# Patient Record
Sex: FEMALE | Race: WHITE | NOT HISPANIC OR LATINO | URBAN - NONMETROPOLITAN AREA
[De-identification: names, ages, dates, MRNs, and addresses within clinical notes are randomized per-mention and may not be internally consistent; named-entity substitution may affect disease eponyms.]

---
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
## 2018-03-02 RX ORDER — PEN NEEDLE, DIABETIC 32GX 5/32"
NEEDLE, DISPOSABLE MISCELLANEOUS
Qty: 200 EACH | Refills: 11 | Status: SHIPPED | OUTPATIENT
Start: 2018-03-02 | End: 2018-06-26

## 2018-03-02 RX ORDER — INSULIN GLARGINE 100 [IU]/ML
INJECTION, SOLUTION SUBCUTANEOUS
Qty: 15 ML | Refills: 11 | Status: SHIPPED | OUTPATIENT
Start: 2018-03-02 | End: 2018-06-26

## 2018-03-02 RX ORDER — INSULIN ASPART 100 [IU]/ML
INJECTION, SOLUTION INTRAVENOUS; SUBCUTANEOUS
Qty: 15 ML | Refills: 11 | Status: SHIPPED | OUTPATIENT
Start: 2018-03-02 | End: 2018-06-26

## 2018-03-15 ENCOUNTER — TELEPHONE (OUTPATIENT)
Dept: ENDOCRINOLOGY | Facility: CLINIC | Age: 18
End: 2018-03-15

## 2018-03-15 ENCOUNTER — TELEPHONE (OUTPATIENT)
Dept: FAMILY MEDICINE CLINIC | Facility: CLINIC | Age: 18
End: 2018-03-15

## 2018-03-28 ENCOUNTER — OFFICE VISIT (OUTPATIENT)
Dept: ENDOCRINOLOGY | Facility: CLINIC | Age: 18
End: 2018-03-28

## 2018-03-28 VITALS
SYSTOLIC BLOOD PRESSURE: 128 MMHG | HEIGHT: 61 IN | WEIGHT: 185 LBS | DIASTOLIC BLOOD PRESSURE: 70 MMHG | BODY MASS INDEX: 34.93 KG/M2 | HEART RATE: 115 BPM

## 2018-03-28 DIAGNOSIS — R80.9 PROTEINURIA, UNSPECIFIED TYPE: ICD-10-CM

## 2018-03-28 DIAGNOSIS — E10.9 TYPE 1 DIABETES MELLITUS WITHOUT COMPLICATION (HCC): Primary | ICD-10-CM

## 2018-03-28 DIAGNOSIS — R63.5 WEIGHT GAIN: ICD-10-CM

## 2018-03-28 PROCEDURE — 99214 OFFICE O/P EST MOD 30 MIN: CPT | Performed by: NURSE PRACTITIONER

## 2018-03-28 NOTE — PROGRESS NOTES
Subjective    Betty Doe is a 17 y.o. female. she is here today for follow-up.    History of Present Illness      Duration/Timing:  Diabetes mellitus type 1, Age at onset of diabetes: 5 years, Onset of symptoms sudden  timing - constant  quality - better controlled--   severity - moderate '     Severity (Complications/Hospitalizations)  Secondary Macrovascular Complications:  No CAD, No CVA, No PAD  Secondary Microvascular Complications:  No Diabetic Nephropathy, Proteinuria, No Diabetic Retinopathy, No Diabetic Neuropathy:  Patient has had DKA     Context  Diabetes Regimen:  Insulin, Last HgbA1c% 9 in Dec. 2016            Blood Glucose Readings     Running 100 to 130      Diet  - counts carbs     Midnight to 4 am --  50 s      Exercise:  Exercises     Associated Signs/Symptoms         Hyperglycemic symptoms - no polyuria, polydipsia, polyphagia       Hypoglycemic Episodes:  Documented symptomatic hypoglycemia, Nocturnal hypoglycemia                   The following portions of the patient's history were reviewed and updated as appropriate:   Past Medical History:   Diagnosis Date   • Anxiety state    • Asthma    • Dehydration    • Diabetic renal disease    • Dysfunctional grieving    • Knee pain    • Proteinuria    • Tachycardia    • Type 1 diabetes mellitus without complication      No past surgical history on file.  Family History   Problem Relation Age of Onset   • Cancer Other    • Diabetes Other    • Hypertension Other    • Stroke Other    • Heart disease Other    • Other Other      gallstones, colon problems     OB History     No data available        Current Outpatient Prescriptions   Medication Sig Dispense Refill   • ACCU-CHEK ARIANA PLUS test strip 1 strip 4 (Four) Times a Day.  99   • BD PEN NEEDLE DARRIUS U/F 32G X 4 MM misc USE AS DIRECTED 5 TIMES DAILY 200 each 11   • clonazePAM (KlonoPIN) 0.5 MG tablet Take 1 tablet by mouth 2 (Two) Times a Day As Needed for Anxiety. 60 tablet 0   • EASY TOUCH  "INSULIN SYRINGE 31G X 5/16\" 1 ML misc Inject 3 times daily 150 each 11   • insulin aspart (NOVOLOG) 100 UNIT/ML injection Inject 90 units in the pump per day 5 each 5   • Insulin Glargine (BASAGLAR KWIKPEN) 100 UNIT/ML injection pen INJECT 50 UNITS AS DIRECTED IN THE EVENING 15 mL 11   • Insulin Pen Needle (COMFORT EZ PEN NEEDLES) 32G X 5 MM misc Inject 1 time daily 100 each 11   • NOVOLOG FLEXPEN 100 UNIT/ML solution pen-injector sc pen INJECT 30 UNITS AS DIRECTED THREE TIMES A DAY WITH MEALS 15 mL 11     No current facility-administered medications for this visit.      Allergies   Allergen Reactions   • Cephalosporins Anaphylaxis   • Aztreonam Itching and Rash   • Penicillins Rash     Social History     Social History   • Marital status: Single     Social History Main Topics   • Smoking status: Never Smoker   • Smokeless tobacco: Never Used   • Alcohol use No   • Drug use: No   • Sexual activity: Defer     Other Topics Concern   • Not on file       Review of Systems  Review of Systems   Constitutional: Negative for activity change, appetite change, diaphoresis and fatigue.   HENT: Negative for facial swelling, sneezing, sore throat, tinnitus, trouble swallowing and voice change.    Eyes: Negative for photophobia, pain, discharge, redness, itching and visual disturbance.   Respiratory: Negative for apnea, cough, choking, chest tightness and shortness of breath.    Cardiovascular: Negative for chest pain, palpitations and leg swelling.   Gastrointestinal: Negative for abdominal distention, abdominal pain, constipation, diarrhea, nausea and vomiting.   Endocrine: Negative for cold intolerance, heat intolerance, polydipsia, polyphagia and polyuria.   Genitourinary: Negative for difficulty urinating, dysuria, frequency, hematuria and urgency.   Musculoskeletal: Negative for arthralgias, back pain, gait problem, joint swelling, myalgias, neck pain and neck stiffness.   Skin: Negative for color change, pallor, rash and " "wound.   Neurological: Negative for dizziness, tremors, weakness, light-headedness, numbness and headaches.   Hematological: Negative for adenopathy. Does not bruise/bleed easily.   Psychiatric/Behavioral: Negative for behavioral problems, confusion and sleep disturbance.        Objective    /70 (BP Location: Left arm, Patient Position: Sitting, Cuff Size: Adult)   Pulse 115   Ht 154.9 cm (61\")   Wt 83.9 kg (185 lb)   BMI 34.96 kg/m²   Physical Exam   Constitutional: She is oriented to person, place, and time. She appears well-developed and well-nourished. No distress.   HENT:   Head: Normocephalic and atraumatic.   Right Ear: External ear normal.   Left Ear: External ear normal.   Nose: Nose normal.   Eyes: Conjunctivae and EOM are normal. Pupils are equal, round, and reactive to light.   Neck: Normal range of motion. Neck supple. No tracheal deviation present. No thyromegaly present.   Cardiovascular: Normal rate, regular rhythm and normal heart sounds.    No murmur heard.  Pulmonary/Chest: Effort normal and breath sounds normal. No respiratory distress. She has no wheezes.   Abdominal: Soft. Bowel sounds are normal. There is no tenderness. There is no rebound and no guarding.   Musculoskeletal: Normal range of motion. She exhibits no edema, tenderness or deformity.   Right leg in boot    Neurological: She is alert and oriented to person, place, and time. No cranial nerve deficit.   Skin: Skin is warm and dry. No rash noted.   Psychiatric: She has a normal mood and affect. Her behavior is normal. Judgment and thought content normal.       Lab Review  Glucose (mg/dl)   Date Value   03/28/2014 121 (H)   03/28/2014 182 (H)   03/28/2014 219 (C)     Sodium (mmol/L)   Date Value   03/28/2014 138   03/28/2014 136   03/28/2014 134 (L)     Potassium (mmol/L)   Date Value   03/28/2014 3.9   03/28/2014 3.3 (L)   03/28/2014 3.3 (L)     Chloride (mmol/L)   Date Value   03/28/2014 111 (H)   03/28/2014 109   03/28/2014 " "108     CO2 (mmol/L)   Date Value   03/28/2014 17 (L)   03/28/2014 18 (L)   03/28/2014 15 (L)     BUN (mg/dl)   Date Value   03/28/2014 7 (L)   03/28/2014 7 (L)   03/28/2014 7 (L)     Creatinine (mg/dl)   Date Value   03/28/2014 0.3 (L)   03/28/2014 0.3 (L)   03/28/2014 0.3 (L)     Hemoglobin A1C (%TotHgb)   Date Value   03/27/2014 13.4 (H)       Assessment/Plan      1. Type 1 diabetes mellitus without complication    .    Medications prescribed:  Outpatient Encounter Prescriptions as of 3/28/2018   Medication Sig Dispense Refill   • ACCU-CHEK ARIANA PLUS test strip 1 strip 4 (Four) Times a Day.  99   • BD PEN NEEDLE DARRIUS U/F 32G X 4 MM misc USE AS DIRECTED 5 TIMES DAILY 200 each 11   • clonazePAM (KlonoPIN) 0.5 MG tablet Take 1 tablet by mouth 2 (Two) Times a Day As Needed for Anxiety. 60 tablet 0   • EASY TOUCH INSULIN SYRINGE 31G X 5/16\" 1 ML misc Inject 3 times daily 150 each 11   • insulin aspart (NOVOLOG) 100 UNIT/ML injection Inject 90 units in the pump per day 5 each 5   • Insulin Glargine (BASAGLAR KWIKPEN) 100 UNIT/ML injection pen INJECT 50 UNITS AS DIRECTED IN THE EVENING 15 mL 11   • Insulin Pen Needle (COMFORT EZ PEN NEEDLES) 32G X 5 MM misc Inject 1 time daily 100 each 11   • NOVOLOG FLEXPEN 100 UNIT/ML solution pen-injector sc pen INJECT 30 UNITS AS DIRECTED THREE TIMES A DAY WITH MEALS 15 mL 11   • [DISCONTINUED] clonazePAM (KlonoPIN) 0.5 MG tablet Take 0.5 mg by mouth.       No facility-administered encounter medications on file as of 3/28/2018.        Orders placed during this encounter include:  No orders of the defined types were placed in this encounter.      Glycemic Management     Now on injections     -Basaglar taking 50 units -- taking 46 units -- decrease to 42 units            Novolog   ---  1 unit for every 4 CHO --- average about 40 units total a day     Do 1 unit per 8 of CHO for snacks      +      Sliding scale            Off insulin pump --- tandem insulin pump -- off insulin pump due " to infections         BASAL     MN - 2.0--- decreased to 1.80  10am - 2.0  6:30pm- 2.00        Carb ratio     MN - 4   12pm -4     Sensitivity     50     target bg 115-120           Microvascular Complication Monitoring     had h o proteinuria and on lisinopril stopped the lisinopril     need to repeat urine protein                 4. Follow-up: No Follow-up on file.

## 2018-06-26 ENCOUNTER — LAB (OUTPATIENT)
Dept: LAB | Facility: OTHER | Age: 18
End: 2018-06-26

## 2018-06-26 ENCOUNTER — OFFICE VISIT (OUTPATIENT)
Dept: FAMILY MEDICINE CLINIC | Facility: CLINIC | Age: 18
End: 2018-06-26

## 2018-06-26 VITALS
WEIGHT: 191.2 LBS | HEIGHT: 61 IN | HEART RATE: 78 BPM | BODY MASS INDEX: 36.1 KG/M2 | DIASTOLIC BLOOD PRESSURE: 66 MMHG | SYSTOLIC BLOOD PRESSURE: 122 MMHG | TEMPERATURE: 98.7 F

## 2018-06-26 DIAGNOSIS — R53.83 FATIGUE, UNSPECIFIED TYPE: Primary | ICD-10-CM

## 2018-06-26 DIAGNOSIS — R59.1 LYMPHADENOPATHY: ICD-10-CM

## 2018-06-26 DIAGNOSIS — E10.9 TYPE 1 DIABETES MELLITUS WITHOUT COMPLICATION (HCC): Chronic | ICD-10-CM

## 2018-06-26 DIAGNOSIS — R53.83 FATIGUE, UNSPECIFIED TYPE: ICD-10-CM

## 2018-06-26 LAB
25(OH)D3 SERPL-MCNC: 29.2 NG/ML (ref 30–100)
ALBUMIN SERPL-MCNC: 4.7 G/DL (ref 3.5–5)
ALBUMIN UR-MCNC: 1.2 MG/L
ALBUMIN/GLOB SERPL: 1.7 G/DL (ref 1.1–1.8)
ALP SERPL-CCNC: 112 U/L (ref 38–126)
ALT SERPL W P-5'-P-CCNC: 17 U/L
ANION GAP SERPL CALCULATED.3IONS-SCNC: 12 MMOL/L (ref 5–15)
AST SERPL-CCNC: 16 U/L (ref 14–36)
BASOPHILS # BLD AUTO: 0.04 10*3/MM3 (ref 0–0.2)
BASOPHILS NFR BLD AUTO: 0.5 % (ref 0–2)
BILIRUB SERPL-MCNC: 0.5 MG/DL (ref 0.2–1.3)
BUN BLD-MCNC: 14 MG/DL (ref 7–17)
BUN/CREAT SERPL: 24.1 (ref 7–25)
CALCIUM SPEC-SCNC: 9.2 MG/DL (ref 8.4–10.2)
CHLORIDE SERPL-SCNC: 100 MMOL/L (ref 98–107)
CO2 SERPL-SCNC: 26 MMOL/L (ref 22–30)
CREAT BLD-MCNC: 0.58 MG/DL (ref 0.52–1.04)
CREAT UR-MCNC: 118 MG/DL
CREAT UR-MCNC: 118 MG/DL
DEPRECATED RDW RBC AUTO: 42.1 FL (ref 36.4–46.3)
EOSINOPHIL # BLD AUTO: 0.22 10*3/MM3 (ref 0–0.7)
EOSINOPHIL NFR BLD AUTO: 2.9 % (ref 0–7)
ERYTHROCYTE [DISTWIDTH] IN BLOOD BY AUTOMATED COUNT: 13.8 % (ref 11.5–14.5)
FERRITIN SERPL-MCNC: 15.3 NG/ML (ref 6.2–137)
FOLATE SERPL-MCNC: 11.2 NG/ML (ref 2.76–21)
GFR SERPL CREATININE-BSD FRML MDRD: 134 ML/MIN/1.73 (ref 71–165)
GLOBULIN UR ELPH-MCNC: 2.8 GM/DL (ref 2.3–3.5)
GLUCOSE BLD-MCNC: 330 MG/DL (ref 74–99)
HBA1C MFR BLD: 9.1 % (ref 4–5.6)
HCT VFR BLD AUTO: 43.5 % (ref 35–45)
HGB BLD-MCNC: 14.2 G/DL (ref 12–15.5)
IRON 24H UR-MRATE: 52 MCG/DL (ref 37–170)
IRON SATN MFR SERPL: 15 % (ref 15–50)
LYMPHOCYTES # BLD AUTO: 2.09 10*3/MM3 (ref 0.6–4.2)
LYMPHOCYTES NFR BLD AUTO: 27.9 % (ref 10–50)
MCH RBC QN AUTO: 27.4 PG (ref 26.5–34)
MCHC RBC AUTO-ENTMCNC: 32.6 G/DL (ref 31.4–36)
MCV RBC AUTO: 83.8 FL (ref 80–98)
MICROALBUMIN/CREAT UR: 10.2 MG/G (ref 0–30)
MONOCYTES # BLD AUTO: 0.51 10*3/MM3 (ref 0–0.9)
MONOCYTES NFR BLD AUTO: 6.8 % (ref 0–12)
NEUTROPHILS # BLD AUTO: 4.64 10*3/MM3 (ref 2–8.6)
NEUTROPHILS NFR BLD AUTO: 61.9 % (ref 37–80)
PLATELET # BLD AUTO: 408 10*3/MM3 (ref 150–450)
PMV BLD AUTO: 9.8 FL (ref 8–12)
POTASSIUM BLD-SCNC: 4.3 MMOL/L (ref 3.4–5)
PROT SERPL-MCNC: 7.5 G/DL (ref 6.3–8.2)
PROT UR-MCNC: 8.9 MG/DL
PROT/CREAT UR: 75.4 MG/G CREA (ref 0–200)
RBC # BLD AUTO: 5.19 10*6/MM3 (ref 3.77–5.16)
SODIUM BLD-SCNC: 138 MMOL/L (ref 137–145)
T4 FREE SERPL-MCNC: 0.96 NG/DL (ref 0.78–2.19)
TIBC SERPL-MCNC: 344 MCG/DL (ref 265–497)
TSH SERPL DL<=0.05 MIU/L-ACNC: 1.04 MIU/ML (ref 0.46–4.68)
VIT B12 BLD-MCNC: 623 PG/ML (ref 239–931)
WBC NRBC COR # BLD: 7.5 10*3/MM3 (ref 3.2–9.8)

## 2018-06-26 PROCEDURE — 83036 HEMOGLOBIN GLYCOSYLATED A1C: CPT | Performed by: NURSE PRACTITIONER

## 2018-06-26 PROCEDURE — 82043 UR ALBUMIN QUANTITATIVE: CPT | Performed by: NURSE PRACTITIONER

## 2018-06-26 PROCEDURE — 84481 FREE ASSAY (FT-3): CPT | Performed by: NURSE PRACTITIONER

## 2018-06-26 PROCEDURE — 82746 ASSAY OF FOLIC ACID SERUM: CPT | Performed by: NURSE PRACTITIONER

## 2018-06-26 PROCEDURE — 84156 ASSAY OF PROTEIN URINE: CPT | Performed by: NURSE PRACTITIONER

## 2018-06-26 PROCEDURE — 82306 VITAMIN D 25 HYDROXY: CPT | Performed by: NURSE PRACTITIONER

## 2018-06-26 PROCEDURE — 84439 ASSAY OF FREE THYROXINE: CPT | Performed by: NURSE PRACTITIONER

## 2018-06-26 PROCEDURE — 82728 ASSAY OF FERRITIN: CPT | Performed by: NURSE PRACTITIONER

## 2018-06-26 PROCEDURE — 83540 ASSAY OF IRON: CPT | Performed by: NURSE PRACTITIONER

## 2018-06-26 PROCEDURE — 99214 OFFICE O/P EST MOD 30 MIN: CPT | Performed by: NURSE PRACTITIONER

## 2018-06-26 PROCEDURE — 83550 IRON BINDING TEST: CPT | Performed by: NURSE PRACTITIONER

## 2018-06-26 PROCEDURE — 80050 GENERAL HEALTH PANEL: CPT | Performed by: NURSE PRACTITIONER

## 2018-06-26 PROCEDURE — 82570 ASSAY OF URINE CREATININE: CPT | Performed by: NURSE PRACTITIONER

## 2018-06-26 PROCEDURE — 82607 VITAMIN B-12: CPT | Performed by: NURSE PRACTITIONER

## 2018-06-26 NOTE — PROGRESS NOTES
"Subjective   Betty Doe is a 17 y.o. female. Patient here today with complaints of Swollen Glands  pt here today with mother reporting weight gain of 60 pounds over the last year, looking over her vital signs she has gained approx 30 pounds since 7/17, she has noticed \"2 knots\" on L neck present x last several months, worsening. Reports fatigue, has hx of type 1 diabetes and cont follow up with [facility].states fam hx of lymphoma, denies fam hx of thyroid problems. She relates that she is walking approx 6 miles 3-4 days/week. 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 Reports hx last fall of sepsis from UTI. Was seen at urgent care yesterday, given doxycycline but has not yet started , has not yet had filled. Denies recent tick bites.    Vitals:    06/26/18 0838   BP: 122/66   Pulse: 78   Temp: 98.7 °F (37.1 °C)     Past Medical History:   Diagnosis Date   • Anxiety state    • Asthma    • Dehydration    • Diabetic renal disease    • Dysfunctional grieving    • Knee pain    • Proteinuria    • Tachycardia    • Type 1 diabetes mellitus without complication      Fatigue   This is a new problem. The current episode started more than 1 month ago. The problem occurs constantly. The problem has been waxing and waning. Associated symptoms include fatigue and swollen glands. Pertinent negatives include no abdominal pain, anorexia, arthralgias, change in bowel habit, chest pain, chills, congestion, coughing, diaphoresis, fever, headaches, joint swelling, myalgias, nausea, neck pain, numbness, rash, sore throat, urinary symptoms, vertigo, visual change, vomiting or weakness. Nothing aggravates the symptoms. She has tried rest, sleep and relaxation for the symptoms. The treatment provided no relief.        The following portions of the patient's history were reviewed and updated as appropriate: allergies, current medications, past family history, past medical history, " past social history, past surgical history and problem list.    Review of Systems   Constitutional: Positive for fatigue. Negative for chills, diaphoresis and fever.   HENT: Negative.  Negative for congestion and sore throat.    Eyes: Negative.    Respiratory: Negative.  Negative for cough.    Cardiovascular: Negative.  Negative for chest pain.   Gastrointestinal: Negative.  Negative for abdominal pain, anorexia, change in bowel habit, nausea and vomiting.   Endocrine: Negative.    Genitourinary: Negative.    Musculoskeletal: Negative.  Negative for arthralgias, joint swelling, myalgias and neck pain.   Skin: Negative.  Negative for rash.   Allergic/Immunologic: Negative.    Neurological: Negative.  Negative for vertigo, weakness, numbness and headaches.   Hematological: Negative.    Psychiatric/Behavioral: Negative.        Objective   Physical Exam   Constitutional: She is oriented to person, place, and time. She appears well-developed and well-nourished. No distress.   HENT:   Head: Normocephalic and atraumatic.   Right Ear: External ear normal.   Left Ear: External ear normal.   Nose: Nose normal.   Mouth/Throat: Oropharynx is clear and moist. No oropharyngeal exudate.   Neck: Neck supple. No neck rigidity. No edema and no erythema present.       Cardiovascular: Normal rate, regular rhythm and normal heart sounds.  Exam reveals no gallop and no friction rub.    No murmur heard.  Pulmonary/Chest: Effort normal and breath sounds normal. No respiratory distress. She has no wheezes. She has no rales.   Abdominal: Soft. Bowel sounds are normal. She exhibits no distension and no mass. There is no tenderness. There is no rebound and no guarding. No hernia.   With obesity, exam somewhat limited due to pt habitus   Lymphadenopathy:     She has cervical adenopathy (palp, enlarged post cervical chain lymph node on L).   Neurological: She is alert and oriented to person, place, and time.   Skin: Skin is warm and dry. No rash  noted. She is not diaphoretic. No erythema. No pallor.   Psychiatric: She has a normal mood and affect. Her behavior is normal.   Nursing note and vitals reviewed.      Assessment/Plan   Betty was seen today for swollen glands.    Diagnoses and all orders for this visit:    Fatigue, unspecified type  -     CBC & Differential; Future  -     Comprehensive metabolic panel; Future  -     TSH; Future  -     T4, free; Future  -     T3, free; Future  -     Ferritin; Future  -     Folate; Future  -     Iron and TIBC; Future  -     Vitamin B12; Future  -     US Head Neck Soft Tissue    Lymphadenopathy  -     CBC & Differential; Future  -     Comprehensive metabolic panel; Future  -     TSH; Future  -     T4, free; Future  -     T3, free; Future  -     Ferritin; Future  -     Folate; Future  -     Iron and TIBC; Future  -     Vitamin B12; Future  -     US Head Neck Soft Tissue    labs ordered as above, US soft tissue neck ordered and she is to follow up here after for results, possible referral if nec. Take all of antibiotic, doxycycline. They are aware and are in agreement to this plan. Patient's Body mass index is 36.13 kg/m². BMI is obese. All questions and concerns are addressed with understanding noted.

## 2018-06-27 LAB — T3FREE SERPL-MCNC: 3.1 PG/ML (ref 2.3–5)

## 2018-06-28 DIAGNOSIS — R59.1 LYMPHADENOPATHY: Primary | ICD-10-CM

## 2018-06-29 ENCOUNTER — TELEPHONE (OUTPATIENT)
Dept: FAMILY MEDICINE CLINIC | Facility: CLINIC | Age: 18
End: 2018-06-29

## 2018-07-02 DIAGNOSIS — R59.9 LYMPH NODE ENLARGEMENT: Primary | ICD-10-CM

## 2018-07-03 ENCOUNTER — OFFICE VISIT (OUTPATIENT)
Dept: FAMILY MEDICINE CLINIC | Facility: CLINIC | Age: 18
End: 2018-07-03

## 2018-07-03 VITALS
DIASTOLIC BLOOD PRESSURE: 72 MMHG | SYSTOLIC BLOOD PRESSURE: 128 MMHG | WEIGHT: 194 LBS | HEART RATE: 114 BPM | BODY MASS INDEX: 36.63 KG/M2 | HEIGHT: 61 IN | OXYGEN SATURATION: 98 %

## 2018-07-03 DIAGNOSIS — R59.1 LYMPHADENOPATHY: ICD-10-CM

## 2018-07-03 DIAGNOSIS — J01.00 ACUTE NON-RECURRENT MAXILLARY SINUSITIS: Primary | ICD-10-CM

## 2018-07-03 DIAGNOSIS — R59.9 SWOLLEN LYMPH NODES: Primary | ICD-10-CM

## 2018-07-03 DIAGNOSIS — F41.1 ANXIETY STATE: ICD-10-CM

## 2018-07-03 DIAGNOSIS — R63.5 WEIGHT GAIN: ICD-10-CM

## 2018-07-03 DIAGNOSIS — E10.9 TYPE 1 DIABETES MELLITUS WITHOUT COMPLICATION (HCC): ICD-10-CM

## 2018-07-03 PROCEDURE — 99214 OFFICE O/P EST MOD 30 MIN: CPT | Performed by: NURSE PRACTITIONER

## 2018-07-03 RX ORDER — LEVOFLOXACIN 500 MG/1
500 TABLET, FILM COATED ORAL DAILY
Qty: 7 TABLET | Refills: 0 | Status: SHIPPED | OUTPATIENT
Start: 2018-07-03 | End: 2018-07-20

## 2018-07-03 RX ORDER — FEXOFENADINE HCL AND PSEUDOEPHEDRINE HCI 180; 240 MG/1; MG/1
1 TABLET, EXTENDED RELEASE ORAL DAILY
Qty: 30 TABLET | Refills: 0 | Status: SHIPPED | OUTPATIENT
Start: 2018-07-03

## 2018-07-03 NOTE — PROGRESS NOTES
Subjective   Betty Doe is a 17 y.o. female. Patient here today with complaints of Results (Go over labs and US and CT. Gained 4 lbs in one week. )  pt here today with mother for CT results, still having fatigue, lymphadenopathy, is diabetic but states fsbs this am 109, has been 112-117 recently. Recently was given doxycycline, symptoms continue. Denies nasal congestion, hx anxiety, diabetes, sepsis from UTI, CT neck was performed without contrast due to hx renal disease. Was initially ordered with contrast as rec per radiology but contrast declined per pt    Vitals:    07/03/18 0900   BP: 128/72   Pulse: 114   SpO2: 98%     Past Medical History:   Diagnosis Date   • Anxiety state    • Asthma    • Dehydration    • Diabetic renal disease (CMS/HCC)    • Dysfunctional grieving    • Knee pain    • Proteinuria    • Tachycardia    • Type 1 diabetes mellitus without complication (CMS/HCC)      Headache    This is a recurrent problem. The current episode started more than 1 month ago. The problem occurs intermittently. The problem has been waxing and waning. The pain is located in the occipital and left unilateral region. The pain does not radiate. The pain quality is similar to prior headaches. The quality of the pain is described as aching. The pain is mild. Associated symptoms include swollen glands. Pertinent negatives include no abdominal pain, abnormal behavior, anorexia, back pain, blurred vision, coughing, dizziness, drainage, ear pain, eye pain, eye redness, eye watering, facial sweating, fever, hearing loss, loss of balance, muscle aches, nausea, neck pain, numbness, phonophobia, photophobia, rhinorrhea, scalp tenderness, seizures, sinus pressure, sore throat, tingling, tinnitus, visual change, vomiting, weakness or weight loss. Nothing aggravates the symptoms. She has tried NSAIDs for the symptoms. The treatment provided mild relief. Her past medical history is significant for obesity and sinus disease.    Sinusitis   This is a new problem. The current episode started 1 to 4 weeks ago. The problem is unchanged. There has been no fever. The pain is mild. Associated symptoms include congestion, headaches and swollen glands. Pertinent negatives include no chills, coughing, diaphoresis, ear pain, hoarse voice, neck pain, shortness of breath, sinus pressure, sneezing or sore throat. Past treatments include antibiotics. The treatment provided no relief.   Fatigue   This is a new problem. The current episode started 1 to 4 weeks ago. The problem occurs constantly. The problem has been unchanged. Associated symptoms include congestion, fatigue, headaches and swollen glands. Pertinent negatives include no abdominal pain, anorexia, arthralgias, change in bowel habit, chest pain, chills, coughing, diaphoresis, fever, joint swelling, myalgias, nausea, neck pain, numbness, rash, sore throat, urinary symptoms, vertigo, visual change, vomiting or weakness. The symptoms are aggravated by exertion. She has tried rest, sleep and relaxation for the symptoms. The treatment provided no relief.        The following portions of the patient's history were reviewed and updated as appropriate: allergies, current medications, past family history, past medical history, past social history, past surgical history and problem list.    Review of Systems   Constitutional: Positive for fatigue. Negative for chills, diaphoresis, fever and weight loss.   HENT: Positive for congestion. Negative for ear pain, hearing loss, hoarse voice, rhinorrhea, sinus pressure, sneezing, sore throat and tinnitus.    Eyes: Negative.  Negative for blurred vision, photophobia, pain and redness.   Respiratory: Negative.  Negative for cough and shortness of breath.    Cardiovascular: Negative.  Negative for chest pain.   Gastrointestinal: Negative.  Negative for abdominal pain, anorexia, change in bowel habit, nausea and vomiting.   Endocrine: Negative.    Genitourinary:  Negative.    Musculoskeletal: Negative.  Negative for arthralgias, back pain, joint swelling, myalgias and neck pain.   Skin: Negative.  Negative for rash.   Allergic/Immunologic: Negative.    Neurological: Positive for headaches. Negative for dizziness, vertigo, tingling, seizures, weakness, numbness and loss of balance.   Hematological: Negative.    Psychiatric/Behavioral: Negative.        Objective   Physical Exam   Constitutional: She is oriented to person, place, and time. She appears well-developed and well-nourished. No distress.   HENT:   Head: Normocephalic and atraumatic.   Right Ear: Hearing, external ear and ear canal normal. Tympanic membrane is bulging.   Left Ear: Hearing, external ear and ear canal normal. Tympanic membrane is bulging.   Nose: Mucosal edema present. Right sinus exhibits no maxillary sinus tenderness and no frontal sinus tenderness. Left sinus exhibits no maxillary sinus tenderness and no frontal sinus tenderness.   Mouth/Throat: Uvula is midline and mucous membranes are normal. Posterior oropharyngeal erythema (with cobblestoning appearance) present. No tonsillar exudate.   Neck: Neck supple.   Cardiovascular: Normal rate, regular rhythm and normal heart sounds.  Exam reveals no gallop and no friction rub.    No murmur heard.  Pulmonary/Chest: Effort normal and breath sounds normal. No respiratory distress. She has no wheezes. She has no rales.   Abdominal:   With obesity, exam limited   Lymphadenopathy:     She has cervical adenopathy (tender to palpation).   Neurological: She is alert and oriented to person, place, and time.   Skin: Skin is warm and dry. No rash noted. She is not diaphoretic. No erythema. No pallor.   Psychiatric: Her behavior is normal. Her mood appears anxious.   Nursing note and vitals reviewed.      Assessment/Plan   Betty was seen today for results.    Diagnoses and all orders for this visit:    Acute non-recurrent maxillary  sinusitis    Lymphadenopathy  Comments:  tender, cervical      Weight gain    Anxiety state    Type 1 diabetes mellitus without complication (CMS/[facility])  Comments:  following with dr jose/roni    Other orders  -     fexofenadine-pseudoephedrine (ALLEGRA-D ALLERGY & CONGESTION) 180-240 MG per 24 hr tablet; Take 1 tablet by mouth Daily.  -     levoFLOXacin (LEVAQUIN) 500 MG tablet; Take 1 tablet by mouth Daily.    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   She is aware and is in agreement to this plan  Patient's Body mass index is 36.68 kg/m². BMI is obese.   All questions and concerns are addressed with understanding noted.   She is treated with levaquin and allegra d as above, stop doxycycline which she states has not helped her symptoms.   RTC should symptoms persist/worsen.  She refuses flonase stating that she has panic attacks/severe anxiety with using nasal sprays.   Steroids not given due to diabetes.

## 2018-07-09 DIAGNOSIS — R59.9 SWOLLEN LYMPH NODES: Primary | ICD-10-CM

## 2018-07-20 ENCOUNTER — TELEPHONE (OUTPATIENT)
Dept: ENDOCRINOLOGY | Facility: CLINIC | Age: 18
End: 2018-07-20

## 2018-07-20 ENCOUNTER — OFFICE VISIT (OUTPATIENT)
Dept: ENDOCRINOLOGY | Facility: CLINIC | Age: 18
End: 2018-07-20

## 2018-07-20 VITALS
HEIGHT: 61 IN | DIASTOLIC BLOOD PRESSURE: 82 MMHG | WEIGHT: 194 LBS | BODY MASS INDEX: 36.63 KG/M2 | HEART RATE: 93 BPM | SYSTOLIC BLOOD PRESSURE: 118 MMHG

## 2018-07-20 DIAGNOSIS — R59.0 ENLARGED LYMPH NODE IN NECK: ICD-10-CM

## 2018-07-20 DIAGNOSIS — R59.9 ENLARGED LYMPH NODES: Primary | ICD-10-CM

## 2018-07-20 DIAGNOSIS — E10.9 TYPE 1 DIABETES MELLITUS WITHOUT COMPLICATION (HCC): Primary | ICD-10-CM

## 2018-07-20 PROCEDURE — 99214 OFFICE O/P EST MOD 30 MIN: CPT | Performed by: NURSE PRACTITIONER

## 2018-07-20 NOTE — TELEPHONE ENCOUNTER
ALIS GIANG (Key: [id number])   Rx #: [id number]   NovoLOG 100UNIT/ML solution   Form  [employer] [place] Medicaid Coverage Determination Form   Plan Contact  [phone] phone   [phone] fax   Created   1 hour ago   Sent to Plan   now   Determination   Wait for Determination  Please wait for the plan to return a determination.

## 2018-07-20 NOTE — PROGRESS NOTES
"  Subjective    Betty Doe is a 18 y.o. female. she is here today for follow-up.    History of Present Illness       Duration/Timing:  Diabetes mellitus type 1, Age at onset of diabetes: 5 years, Onset of symptoms sudden  timing - constant  quality - better controlled--   severity - moderate '     Severity (Complications/Hospitalizations)  Secondary Macrovascular Complications:  No CAD, No CVA, No PAD  Secondary Microvascular Complications:  No Diabetic Nephropathy, Proteinuria, No Diabetic Retinopathy, No Diabetic Neuropathy:  Patient has had DKA     Context    Diabetes Regimen: Insulin    Lab Results   Component Value Date    HGBA1C 9.1 (H) 06/26/2018                Blood Glucose Readings     Running better control     Exercise:  Exercises     Associated Signs/Symptoms           Hyperglycemic symptoms - no polyuria, polydipsia, polyphagia         Hypoglycemic Episodes:  Documented symptomatic hypoglycemia, Nocturnal hypoglycemia              The following portions of the patient's history were reviewed and updated as appropriate:   Past Medical History:   Diagnosis Date   • Anxiety state    • Asthma    • Dehydration    • Diabetic renal disease (CMS/HCC)    • Dysfunctional grieving    • Knee pain    • Proteinuria    • Tachycardia    • Type 1 diabetes mellitus without complication (CMS/HCC)      History reviewed. No pertinent surgical history.  Family History   Problem Relation Age of Onset   • Cancer Other    • Diabetes Other    • Hypertension Other    • Stroke Other    • Heart disease Other    • Other Other         gallstones, colon problems     OB History     No data available        Current Outpatient Prescriptions   Medication Sig Dispense Refill   • insulin aspart (novoLOG) 100 UNIT/ML injection 120 units daily through insulin pump 4 each 11   • clonazePAM (KlonoPIN) 0.5 MG tablet Take 1 tablet by mouth 2 (Two) Times a Day As Needed for Anxiety. 60 tablet 0   • EASY TOUCH INSULIN SYRINGE 31G X 5/16\" 1 " ML misc Inject 3 times daily 150 each 11   • fexofenadine-pseudoephedrine (ALLEGRA-D ALLERGY & CONGESTION) 180-240 MG per 24 hr tablet Take 1 tablet by mouth Daily. 30 tablet 0   • Insulin Admin Supplies (PUMPING INSULIN BOOK VOLUME 2) misc        No current facility-administered medications for this visit.      Allergies   Allergen Reactions   • Aztreonam Itching and Rash   • Cephalosporins Anaphylaxis   • Penicillins Rash     Social History     Social History   • Marital status: Single     Social History Main Topics   • Smoking status: Never Smoker   • Smokeless tobacco: Never Used   • Alcohol use No   • Drug use: No   • Sexual activity: Defer     Other Topics Concern   • Not on file       Review of Systems  Review of Systems   Constitutional: Negative for activity change, appetite change, diaphoresis and fatigue.   HENT: Negative for facial swelling, sneezing, sore throat, tinnitus, trouble swallowing and voice change.    Eyes: Negative for photophobia, pain, discharge, redness, itching and visual disturbance.   Respiratory: Negative for apnea, cough, choking, chest tightness and shortness of breath.    Cardiovascular: Negative for chest pain, palpitations and leg swelling.   Gastrointestinal: Negative for abdominal distention, abdominal pain, constipation, diarrhea, nausea and vomiting.   Endocrine: Negative for cold intolerance, heat intolerance, polydipsia, polyphagia and polyuria.   Genitourinary: Negative for difficulty urinating, dysuria, frequency, hematuria and urgency.   Musculoskeletal: Negative for arthralgias, back pain, gait problem, joint swelling, myalgias, neck pain and neck stiffness.   Skin: Negative for color change, pallor, rash and wound.   Neurological: Negative for dizziness, tremors, weakness, light-headedness, numbness and headaches.   Hematological: Negative for adenopathy. Does not bruise/bleed easily.   Psychiatric/Behavioral: Negative for behavioral problems, confusion and sleep  "disturbance.        Objective    /82 (BP Location: Right arm, Patient Position: Sitting, Cuff Size: Adult)   Pulse 93   Ht 154.9 cm (61\")   Wt 88 kg (194 lb)   BMI 36.66 kg/m²   Physical Exam   Constitutional: She is oriented to person, place, and time. She appears well-developed and well-nourished. No distress.   HENT:   Head: Normocephalic and atraumatic.   Right Ear: External ear normal.   Left Ear: External ear normal.   Nose: Nose normal.   Eyes: Pupils are equal, round, and reactive to light. Conjunctivae and EOM are normal.   Neck: Normal range of motion. Neck supple. No tracheal deviation present. No thyromegaly present.   Cardiovascular: Normal rate, regular rhythm and normal heart sounds.    No murmur heard.  Pulmonary/Chest: Effort normal and breath sounds normal. No respiratory distress. She has no wheezes.   Abdominal: Soft. Bowel sounds are normal. There is no tenderness. There is no rebound and no guarding.   Musculoskeletal: Normal range of motion. She exhibits no edema, tenderness or deformity.   Neurological: She is alert and oriented to person, place, and time. No cranial nerve deficit.   Skin: Skin is warm and dry. No rash noted.   Psychiatric: She has a normal mood and affect. Her behavior is normal. Judgment and thought content normal.       Lab Review  Glucose   Date Value   06/26/2018 330 mg/dL (H)   03/28/2014 121 mg/dl (H)   03/28/2014 182 mg/dl (H)   03/28/2014 219 mg/dl (C)     Sodium (mmol/L)   Date Value   06/26/2018 138   03/28/2014 138   03/28/2014 136   03/28/2014 134 (L)     Potassium (mmol/L)   Date Value   06/26/2018 4.3   03/28/2014 3.9   03/28/2014 3.3 (L)   03/28/2014 3.3 (L)     Chloride (mmol/L)   Date Value   06/26/2018 100   03/28/2014 111 (H)   03/28/2014 109   03/28/2014 108     CO2 (mmol/L)   Date Value   06/26/2018 26.0   03/28/2014 17 (L)   03/28/2014 18 (L)   03/28/2014 15 (L)     BUN   Date Value   06/26/2018 14 mg/dL   03/28/2014 7 mg/dl (L)   03/28/2014 7 " "mg/dl (L)   03/28/2014 7 mg/dl (L)     Creatinine   Date Value   06/26/2018 0.58 mg/dL   03/28/2014 0.3 mg/dl (L)   03/28/2014 0.3 mg/dl (L)   03/28/2014 0.3 mg/dl (L)     Hemoglobin A1C   Date Value   06/26/2018 9.1 % (H)   03/27/2014 13.4 %TotHgb (H)       Assessment/Plan      1. Type 1 diabetes mellitus without complication (CMS/[facility])    2. Enlarged lymph node in neck    .    Medications prescribed:  Outpatient Encounter Prescriptions as of 7/20/2018   Medication Sig Dispense Refill   • insulin aspart (novoLOG) 100 UNIT/ML injection 120 units daily through insulin pump 4 each 11   • [DISCONTINUED] insulin aspart (novoLOG) 100 UNIT/ML injection Inject  under the skin 3 (Three) Times a Day Before Meals.     • clonazePAM (KlonoPIN) 0.5 MG tablet Take 1 tablet by mouth 2 (Two) Times a Day As Needed for Anxiety. 60 tablet 0   • EASY TOUCH INSULIN SYRINGE 31G X 5/16\" 1 ML misc Inject 3 times daily 150 each 11   • fexofenadine-pseudoephedrine (ALLEGRA-D ALLERGY & CONGESTION) 180-240 MG per 24 hr tablet Take 1 tablet by mouth Daily. 30 tablet 0   • Insulin Admin Supplies (PUMPING INSULIN BOOK VOLUME 2) misc      • [DISCONTINUED] levoFLOXacin (LEVAQUIN) 500 MG tablet Take 1 tablet by mouth Daily. 7 tablet 0     No facility-administered encounter medications on file as of 7/20/2018.        Orders placed during this encounter include:  No orders of the defined types were placed in this encounter.    Glycemic Management     Lab Results   Component Value Date    HGBA1C 9.1 (H) 06/26/2018           Previous regimen     -Basaglar  42 units            Novolog   ---       Do 1 unit per 8 of CHO for snacks           Now back on tandem insulin pump         BASAL     MN -1.80          Carb ratio     MN - 5     Sensitivity     50     target bg 115-120           Microvascular Complication Monitoring     had h o proteinuria and on lisinopril stopped the lisinopril     need to repeat urine " protein        -----------------------------------------      Lymph node enlargement in the neck     Refer to ENT          4. Follow-up: Return in about 4 months (around 11/20/2018) for Recheck.

## 2018-07-23 ENCOUNTER — TELEPHONE (OUTPATIENT)
Dept: ENDOCRINOLOGY | Facility: CLINIC | Age: 18
End: 2018-07-23

## 2018-07-27 ENCOUNTER — TELEPHONE (OUTPATIENT)
Dept: FAMILY MEDICINE CLINIC | Facility: CLINIC | Age: 18
End: 2018-07-27

## 2018-08-16 ENCOUNTER — TELEPHONE (OUTPATIENT)
Dept: FAMILY MEDICINE CLINIC | Facility: CLINIC | Age: 18
End: 2018-08-16

## 2018-08-16 ENCOUNTER — TELEPHONE (OUTPATIENT)
Dept: ENDOCRINOLOGY | Facility: CLINIC | Age: 18
End: 2018-08-16

## 2018-08-16 NOTE — TELEPHONE ENCOUNTER
Christie Doe-mother called for her- checking to see if office has ever found out anything about her insulin- that was going to send letter to ins about it- said really needs to know about it-said she is away at college in [place] now. Christie can be reached at [phone].

## 2018-08-29 ENCOUNTER — TELEPHONE (OUTPATIENT)
Dept: INTERNAL MEDICINE | Facility: CLINIC | Age: 18
End: 2018-08-29

## 2018-09-26 ENCOUNTER — TELEPHONE (OUTPATIENT)
Dept: ENDOCRINOLOGY | Facility: CLINIC | Age: 18
End: 2018-09-26

## 2018-11-20 ENCOUNTER — OFFICE VISIT (OUTPATIENT)
Dept: ENDOCRINOLOGY | Facility: CLINIC | Age: 18
End: 2018-11-20

## 2018-11-20 VITALS
HEIGHT: 61 IN | SYSTOLIC BLOOD PRESSURE: 128 MMHG | HEART RATE: 113 BPM | BODY MASS INDEX: 37.95 KG/M2 | DIASTOLIC BLOOD PRESSURE: 80 MMHG | WEIGHT: 201 LBS

## 2018-11-20 DIAGNOSIS — R80.9 MICROALBUMINURIA DUE TO TYPE 1 DIABETES MELLITUS (HCC): ICD-10-CM

## 2018-11-20 DIAGNOSIS — E10.29 MICROALBUMINURIA DUE TO TYPE 1 DIABETES MELLITUS (HCC): ICD-10-CM

## 2018-11-20 DIAGNOSIS — E10.65 TYPE 1 DIABETES MELLITUS WITH HYPERGLYCEMIA (HCC): Primary | ICD-10-CM

## 2018-11-20 PROCEDURE — 99214 OFFICE O/P EST MOD 30 MIN: CPT | Performed by: NURSE PRACTITIONER

## 2018-11-20 NOTE — PROGRESS NOTES
Subjective    Betty Doe is a 18 y.o. female. she is here today for follow-up.    History of Present Illness       Duration/Timing:  Diabetes mellitus type 1, Age at onset of diabetes: 5 years, Onset of symptoms sudden  timing - constant  quality - better controlled--   severity - moderate '     Severity (Complications/Hospitalizations)  Secondary Macrovascular Complications:  No CAD, No CVA, No PAD  Secondary Microvascular Complications:  No Diabetic Nephropathy, Proteinuria, No Diabetic Retinopathy, No Diabetic Neuropathy:  Patient has had DKA     Context     Diabetes Regimen: Insulin           Lab Results   Component Value Date     HGBA1C 9.1 (H) 06/26/2018                  Blood Glucose Readings     States has been variable     States under a lot of stress and sugars are now out of control     States waking up high     She has had some low sugars as well         Exercise:  Exercises     Associated Signs/Symptoms           Hyperglycemic symptoms - no polyuria, polydipsia, polyphagia         Hypoglycemic Episodes:  Documented symptomatic hypoglycemia, Nocturnal hypoglycemia                 The following portions of the patient's history were reviewed and updated as appropriate:   Past Medical History:   Diagnosis Date   • Anxiety state    • Asthma    • Dehydration    • Diabetic renal disease (CMS/HCC)    • Dysfunctional grieving    • Knee pain    • Proteinuria    • Tachycardia    • Type 1 diabetes mellitus without complication (CMS/HCC)      History reviewed. No pertinent surgical history.  Family History   Problem Relation Age of Onset   • Cancer Other    • Diabetes Other    • Hypertension Other    • Stroke Other    • Heart disease Other    • Other Other         gallstones, colon problems     OB History     No data available        Current Outpatient Medications   Medication Sig Dispense Refill   • clonazePAM (KlonoPIN) 0.5 MG tablet Take 1 tablet by mouth 2 (Two) Times a Day As Needed for Anxiety. 60  "tablet 0   • EASY TOUCH INSULIN SYRINGE 31G X 5/16\" 1 ML misc Inject 3 times daily 150 each 11   • fexofenadine-pseudoephedrine (ALLEGRA-D ALLERGY & CONGESTION) 180-240 MG per 24 hr tablet Take 1 tablet by mouth Daily. 30 tablet 0   • Insulin Admin Supplies (PUMPING INSULIN BOOK VOLUME 2) misc      • insulin aspart (novoLOG) 100 UNIT/ML injection 120 units daily through insulin pump 4 each 11   • Insulin Lispro (HUMALOG KWIKPEN) 200 UNIT/ML solution pen-injector Inject 120 Units under the skin Daily. Thru insulin pump. 50 mL 5     No current facility-administered medications for this visit.      Allergies   Allergen Reactions   • Aztreonam Itching and Rash   • Cephalosporins Anaphylaxis   • Penicillins Rash     Social History     Socioeconomic History   • Marital status: Single     Spouse name: Not on file   • Number of children: Not on file   • Years of education: Not on file   • Highest education level: Not on file   Tobacco Use   • Smoking status: Never Smoker   • Smokeless tobacco: Never Used   Substance and Sexual Activity   • Alcohol use: No   • Drug use: No   • Sexual activity: Defer       Review of Systems  Review of Systems   Constitutional: Negative for activity change, appetite change, diaphoresis and fatigue.   HENT: Negative for facial swelling, sneezing, sore throat, tinnitus, trouble swallowing and voice change.    Eyes: Negative for photophobia, pain, discharge, redness, itching and visual disturbance.   Respiratory: Negative for apnea, cough, choking, chest tightness and shortness of breath.    Cardiovascular: Negative for chest pain, palpitations and leg swelling.   Gastrointestinal: Negative for abdominal distention, abdominal pain, constipation, diarrhea, nausea and vomiting.   Endocrine: Negative for cold intolerance, heat intolerance, polydipsia, polyphagia and polyuria.   Genitourinary: Negative for difficulty urinating, dysuria, frequency, hematuria and urgency.   Musculoskeletal: Negative " "for arthralgias, back pain, gait problem, joint swelling, myalgias, neck pain and neck stiffness.   Skin: Negative for color change, pallor, rash and wound.   Neurological: Negative for dizziness, tremors, weakness, light-headedness, numbness and headaches.   Hematological: Negative for adenopathy. Does not bruise/bleed easily.   Psychiatric/Behavioral: Negative for behavioral problems, confusion and sleep disturbance.        Objective    /80 (BP Location: Right arm, Patient Position: Sitting, Cuff Size: Adult)   Pulse 113   Ht 154.9 cm (61\")   Wt 91.2 kg (201 lb)   BMI 37.98 kg/m²   Physical Exam   Constitutional: She is oriented to person, place, and time. She appears well-developed and well-nourished. No distress.   HENT:   Head: Normocephalic and atraumatic.   Right Ear: External ear normal.   Left Ear: External ear normal.   Nose: Nose normal.   Eyes: Conjunctivae and EOM are normal. Pupils are equal, round, and reactive to light.   Neck: Normal range of motion. Neck supple. No tracheal deviation present. No thyromegaly present.   Cardiovascular: Normal rate, regular rhythm and normal heart sounds.   No murmur heard.  Pulmonary/Chest: Effort normal and breath sounds normal. No respiratory distress. She has no wheezes.   Abdominal: Soft. Bowel sounds are normal. There is no tenderness. There is no rebound and no guarding.   Musculoskeletal: Normal range of motion. She exhibits no edema, tenderness or deformity.   Neurological: She is alert and oriented to person, place, and time. No cranial nerve deficit.   Skin: Skin is warm and dry. No rash noted.   Psychiatric: She has a normal mood and affect. Her behavior is normal. Judgment and thought content normal.       Lab Review  Glucose   Date Value   06/26/2018 330 mg/dL (H)   03/28/2014 121 mg/dl (H)   03/28/2014 182 mg/dl (H)   03/28/2014 219 mg/dl (C)     Sodium (mmol/L)   Date Value   06/26/2018 138   03/28/2014 138   03/28/2014 136   03/28/2014 134 " "(L)     Potassium (mmol/L)   Date Value   06/26/2018 4.3   03/28/2014 3.9   03/28/2014 3.3 (L)   03/28/2014 3.3 (L)     Chloride (mmol/L)   Date Value   06/26/2018 100   03/28/2014 111 (H)   03/28/2014 109   03/28/2014 108     CO2 (mmol/L)   Date Value   06/26/2018 26.0   03/28/2014 17 (L)   03/28/2014 18 (L)   03/28/2014 15 (L)     BUN   Date Value   06/26/2018 14 mg/dL   03/28/2014 7 mg/dl (L)   03/28/2014 7 mg/dl (L)   03/28/2014 7 mg/dl (L)     Creatinine   Date Value   06/26/2018 0.58 mg/dL   03/28/2014 0.3 mg/dl (L)   03/28/2014 0.3 mg/dl (L)   03/28/2014 0.3 mg/dl (L)     Hemoglobin A1C   Date Value   06/26/2018 9.1 % (H)   03/27/2014 13.4 %TotHgb (H)       Assessment/Plan      1. Type 1 diabetes mellitus with hyperglycemia (CMS/[facility])    2. Microalbuminuria due to type 1 diabetes mellitus (CMS/[facility])    .    Medications prescribed:  Outpatient Encounter Medications as of 11/20/2018   Medication Sig Dispense Refill   • clonazePAM (KlonoPIN) 0.5 MG tablet Take 1 tablet by mouth 2 (Two) Times a Day As Needed for Anxiety. 60 tablet 0   • EASY TOUCH INSULIN SYRINGE 31G X 5/16\" 1 ML misc Inject 3 times daily 150 each 11   • fexofenadine-pseudoephedrine (ALLEGRA-D ALLERGY & CONGESTION) 180-240 MG per 24 hr tablet Take 1 tablet by mouth Daily. 30 tablet 0   • Insulin Admin Supplies (PUMPING INSULIN BOOK VOLUME 2) misc      • insulin aspart (novoLOG) 100 UNIT/ML injection 120 units daily through insulin pump 4 each 11   • Insulin Lispro (HUMALOG KWIKPEN) 200 UNIT/ML solution pen-injector Inject 120 Units under the skin Daily. Thru insulin pump. 50 mL 5     No facility-administered encounter medications on file as of 11/20/2018.        Orders placed during this encounter include:  Orders Placed This Encounter   Procedures   • Comprehensive Metabolic Panel   • Hemoglobin A1c   • Protein / Creatinine Ratio, Urine - Urine, Clean Catch   • Microalbumin / Creatinine Urine Ratio - Urine, Clean Catch   • CBC & Differential     " Order Specific Question:   Manual Differential     Answer:   No     Glycemic Management           Lab Results   Component Value Date     HGBA1C 9.1 (H) 06/26/2018         can only use Novolog     Cannot use Humalog caused side effects          Previous regimen     -Basaglar  42 units            Novolog   ---        Do 1 unit per 8 of CHO for snacks            Now back on tandem insulin pump      BASAL     MN -1.0  7:30 am - 1.0  7:30 pm - 1.35            Carb ratio     MN - 5     Sensitivity     50      target bg 115-120        She has a dexcom but not able to get sensors at this point          Microvascular Complication Monitoring     had h o proteinuria and on lisinopril stopped the lisinopril     need to repeat urine protein           -----------------------------------------         4. Follow-up: Return in about 4 months (around 3/20/2019).

## 2018-12-17 ENCOUNTER — TELEPHONE (OUTPATIENT)
Dept: ENDOCRINOLOGY | Facility: CLINIC | Age: 18
End: 2018-12-17

## 2018-12-17 NOTE — TELEPHONE ENCOUNTER
ALIS GIANG (Key: [id number])   Rx #: [id number]   NovoLOG 100UNIT/ML SC SOLN   Form  [employer] Electronic [employer] Form   Created   2 days ago   Sent to Plan   2 days ago   Plan Response   23 hours ago   Submit Clinical Questions   16 minutes ago   Determination   Wait for Determination  Please wait for [employer] to return a determination.

## 2018-12-18 ENCOUNTER — TELEPHONE (OUTPATIENT)
Dept: ENDOCRINOLOGY | Facility: CLINIC | Age: 18
End: 2018-12-18

## 2019-03-11 ENCOUNTER — OFFICE VISIT (OUTPATIENT)
Dept: ENDOCRINOLOGY | Facility: CLINIC | Age: 19
End: 2019-03-11

## 2019-03-11 VITALS
SYSTOLIC BLOOD PRESSURE: 118 MMHG | WEIGHT: 202 LBS | DIASTOLIC BLOOD PRESSURE: 74 MMHG | HEART RATE: 91 BPM | HEIGHT: 61 IN | BODY MASS INDEX: 38.14 KG/M2

## 2019-03-11 DIAGNOSIS — E10.65 TYPE 1 DIABETES MELLITUS WITH HYPERGLYCEMIA (HCC): Primary | ICD-10-CM

## 2019-03-11 PROCEDURE — 99213 OFFICE O/P EST LOW 20 MIN: CPT | Performed by: NURSE PRACTITIONER

## 2019-03-11 NOTE — PROGRESS NOTES
Subjective    Betty Doe is a 18 y.o. female. she is here today for follow-up.    History of Present Illness         Duration/Timing:  Diabetes mellitus type 1, Age at onset of diabetes: 5 years, Onset of symptoms sudden  timing - constant  quality - better controlled--   severity - moderate '     Severity (Complications/Hospitalizations)  Secondary Macrovascular Complications:  No CAD, No CVA, No PAD  Secondary Microvascular Complications:  No Diabetic Nephropathy, Proteinuria, No Diabetic Retinopathy, No Diabetic Neuropathy:  Patient has had DKA     Context     Diabetes Regimen: Insulin             Lab Results   Component Value Date    HGBA1C 9.1 (H) 06/26/2018             Blood Glucose Readings     States has been variable      States under a lot of stress and sugars are now out of control      States waking up high      She has had some low sugars as well     Dexcom average is 148          Exercise:  Exercises     Associated Signs/Symptoms           Hyperglycemic symptoms - no polyuria, polydipsia, polyphagia         Hypoglycemic Episodes:  Documented symptomatic hypoglycemia, Nocturnal hypoglycemia       The following portions of the patient's history were reviewed and updated as appropriate:   Past Medical History:   Diagnosis Date   • Anxiety state    • Asthma    • Dehydration    • Diabetic renal disease (CMS/HCC)    • Dysfunctional grieving    • Knee pain    • Proteinuria    • Tachycardia    • Type 1 diabetes mellitus without complication (CMS/HCC)      History reviewed. No pertinent surgical history.  Family History   Problem Relation Age of Onset   • Cancer Other    • Diabetes Other    • Hypertension Other    • Stroke Other    • Heart disease Other    • Other Other         gallstones, colon problems     OB History     No data available        Current Outpatient Medications   Medication Sig Dispense Refill   • clonazePAM (KlonoPIN) 0.5 MG tablet Take 1 tablet by mouth 2 (Two) Times a Day As  "Needed for Anxiety. 60 tablet 0   • EASY TOUCH INSULIN SYRINGE 31G X 5/16\" 1 ML misc Inject 3 times daily 150 each 11   • fexofenadine-pseudoephedrine (ALLEGRA-D ALLERGY & CONGESTION) 180-240 MG per 24 hr tablet Take 1 tablet by mouth Daily. 30 tablet 0   • Insulin Admin Supplies (PUMPING INSULIN BOOK VOLUME 2) misc      • insulin aspart (novoLOG) 100 UNIT/ML injection 120 units daily through insulin pump 4 each 11   • Insulin Lispro (HUMALOG KWIKPEN) 200 UNIT/ML solution pen-injector Inject 120 Units under the skin Daily. Thru insulin pump. 50 mL 5     No current facility-administered medications for this visit.      Allergies   Allergen Reactions   • Aztreonam Itching and Rash   • Cephalosporins Anaphylaxis   • Penicillins Rash     Social History     Socioeconomic History   • Marital status: Single     Spouse name: Not on file   • Number of children: Not on file   • Years of education: Not on file   • Highest education level: Not on file   Tobacco Use   • Smoking status: Never Smoker   • Smokeless tobacco: Never Used   Substance and Sexual Activity   • Alcohol use: No   • Drug use: No   • Sexual activity: Defer       Review of Systems  Review of Systems   Constitutional: Negative for activity change, appetite change, diaphoresis and fatigue.   HENT: Negative for facial swelling, sneezing, sore throat, tinnitus, trouble swallowing and voice change.    Eyes: Negative for photophobia, pain, discharge, redness, itching and visual disturbance.   Respiratory: Negative for apnea, cough, choking, chest tightness and shortness of breath.    Cardiovascular: Negative for chest pain, palpitations and leg swelling.   Gastrointestinal: Negative for abdominal distention, abdominal pain, constipation, diarrhea, nausea and vomiting.   Endocrine: Negative for cold intolerance, heat intolerance, polydipsia, polyphagia and polyuria.   Genitourinary: Negative for difficulty urinating, dysuria, frequency, hematuria and urgency. " "  Musculoskeletal: Negative for arthralgias, back pain, gait problem, joint swelling, myalgias, neck pain and neck stiffness.   Skin: Negative for color change, pallor, rash and wound.   Neurological: Negative for dizziness, tremors, weakness, light-headedness, numbness and headaches.   Hematological: Negative for adenopathy. Does not bruise/bleed easily.   Psychiatric/Behavioral: Negative for behavioral problems, confusion and sleep disturbance.        Objective    /74 (BP Location: Right arm, Patient Position: Sitting, Cuff Size: Adult)   Pulse 91   Ht 154.9 cm (61\")   Wt 91.6 kg (202 lb)   BMI 38.17 kg/m²   Physical Exam   Constitutional: She is oriented to person, place, and time. She appears well-developed and well-nourished. No distress.   HENT:   Head: Normocephalic and atraumatic.   Right Ear: External ear normal.   Left Ear: External ear normal.   Nose: Nose normal.   Eyes: Conjunctivae and EOM are normal. Pupils are equal, round, and reactive to light.   Neck: Normal range of motion. Neck supple. No tracheal deviation present. No thyromegaly present.   Cardiovascular: Normal rate, regular rhythm and normal heart sounds.   No murmur heard.  Pulmonary/Chest: Effort normal and breath sounds normal. No respiratory distress. She has no wheezes.   Abdominal: Soft. Bowel sounds are normal. There is no tenderness. There is no rebound and no guarding.   Musculoskeletal: Normal range of motion. She exhibits no edema, tenderness or deformity.   Neurological: She is alert and oriented to person, place, and time. No cranial nerve deficit.   Skin: Skin is warm and dry. No rash noted.   Psychiatric: She has a normal mood and affect. Her behavior is normal. Judgment and thought content normal.       Lab Review  Glucose   Date Value   06/26/2018 330 mg/dL (H)   03/28/2014 121 mg/dl (H)   03/28/2014 182 mg/dl (H)   03/28/2014 219 mg/dl (C)     Sodium (mmol/L)   Date Value   06/26/2018 138   03/28/2014 138 " "  03/28/2014 136   03/28/2014 134 (L)     Potassium (mmol/L)   Date Value   06/26/2018 4.3   03/28/2014 3.9   03/28/2014 3.3 (L)   03/28/2014 3.3 (L)     Chloride (mmol/L)   Date Value   06/26/2018 100   03/28/2014 111 (H)   03/28/2014 109   03/28/2014 108     CO2 (mmol/L)   Date Value   06/26/2018 26.0   03/28/2014 17 (L)   03/28/2014 18 (L)   03/28/2014 15 (L)     BUN   Date Value   06/26/2018 14 mg/dL   03/28/2014 7 mg/dl (L)   03/28/2014 7 mg/dl (L)   03/28/2014 7 mg/dl (L)     Creatinine   Date Value   06/26/2018 0.58 mg/dL   03/28/2014 0.3 mg/dl (L)   03/28/2014 0.3 mg/dl (L)   03/28/2014 0.3 mg/dl (L)     Hemoglobin A1C   Date Value   06/26/2018 9.1 % (H)   03/27/2014 13.4 %TotHgb (H)       Assessment/Plan      1. Type 1 diabetes mellitus with hyperglycemia (CMS/[facility])    .    Medications prescribed:  Outpatient Encounter Medications as of 3/11/2019   Medication Sig Dispense Refill   • clonazePAM (KlonoPIN) 0.5 MG tablet Take 1 tablet by mouth 2 (Two) Times a Day As Needed for Anxiety. 60 tablet 0   • EASY TOUCH INSULIN SYRINGE 31G X 5/16\" 1 ML misc Inject 3 times daily 150 each 11   • fexofenadine-pseudoephedrine (ALLEGRA-D ALLERGY & CONGESTION) 180-240 MG per 24 hr tablet Take 1 tablet by mouth Daily. 30 tablet 0   • Insulin Admin Supplies (PUMPING INSULIN BOOK VOLUME 2) misc      • insulin aspart (novoLOG) 100 UNIT/ML injection 120 units daily through insulin pump 4 each 11   • Insulin Lispro (HUMALOG KWIKPEN) 200 UNIT/ML solution pen-injector Inject 120 Units under the skin Daily. Thru insulin pump. 50 mL 5     No facility-administered encounter medications on file as of 3/11/2019.        Orders placed during this encounter include:  No orders of the defined types were placed in this encounter.    Glycemic Management               Lab Results   Component Value Date     HGBA1C 9.1 (H) 06/26/2018         can only use Novolog      Cannot use Humalog caused side effects            Previous regimen     -Basaglar "  42 units            Novolog   ---        Do 1 unit per 8 of CHO for snacks            Now back on tandem insulin pump        BASAL     MN -1.0  7:30 am - 1.25  7:30 pm - 1.55           Carb ratio     MN - 5     Sensitivity     50          target bg 115-120           She has a dexcom but not able to get sensors at this point                  Microvascular Complication Monitoring     had h o proteinuria and on lisinopril stopped the lisinopril     need to repeat urine protein           -----------------------------------------         4. Follow-up: Return for Recheck.

## 2019-04-01 RX ORDER — LANCETS 33 GAUGE
EACH MISCELLANEOUS
Qty: 150 EACH | Refills: 11 | Status: SHIPPED | OUTPATIENT
Start: 2019-04-01

## 2019-10-30 ENCOUNTER — TELEPHONE (OUTPATIENT)
Dept: ENDOCRINOLOGY | Facility: CLINIC | Age: 19
End: 2019-10-30

## 2019-10-30 NOTE — TELEPHONE ENCOUNTER
ALIS GIANG (Key: [id number])   Rx #: [id number]   NovoLOG 100UNIT/ML solution   Form  [facility] Electronic Prior Authorization Form   Created   2 days ago   Sent to Plan   11 minutes ago   Plan Response   11 minutes ago   Submit Clinical Questions   1 minute ago   Determination   Wait for Determination  Please wait for [facility] to return a determination.

## 2019-10-31 ENCOUNTER — OFFICE VISIT (OUTPATIENT)
Dept: ENDOCRINOLOGY | Facility: CLINIC | Age: 19
End: 2019-10-31

## 2019-10-31 ENCOUNTER — APPOINTMENT (OUTPATIENT)
Dept: LAB | Facility: HOSPITAL | Age: 19
End: 2019-10-31

## 2019-10-31 VITALS
SYSTOLIC BLOOD PRESSURE: 122 MMHG | HEART RATE: 120 BPM | HEIGHT: 61 IN | DIASTOLIC BLOOD PRESSURE: 82 MMHG | WEIGHT: 209.6 LBS | BODY MASS INDEX: 39.57 KG/M2 | OXYGEN SATURATION: 98 %

## 2019-10-31 DIAGNOSIS — E66.01 MORBIDLY OBESE (HCC): ICD-10-CM

## 2019-10-31 DIAGNOSIS — R80.9 PROTEINURIA, UNSPECIFIED TYPE: ICD-10-CM

## 2019-10-31 DIAGNOSIS — E10.65 TYPE 1 DIABETES MELLITUS WITH HYPERGLYCEMIA (HCC): Primary | ICD-10-CM

## 2019-10-31 PROBLEM — E66.9 CLASS 2 OBESITY WITH BODY MASS INDEX (BMI) OF 38.0 TO 38.9 IN ADULT: Status: ACTIVE | Noted: 2019-10-31

## 2019-10-31 LAB
ALBUMIN UR-MCNC: 3.2 MG/DL
CREAT UR-MCNC: 186.2 MG/DL
CREAT UR-MCNC: 186.2 MG/DL
MICROALBUMIN/CREAT UR: 17.2 MG/G
PROT UR-MCNC: 15 MG/DL
PROT/CREAT UR: 80.6 MG/G CREA (ref 0–200)

## 2019-10-31 PROCEDURE — 82570 ASSAY OF URINE CREATININE: CPT | Performed by: NURSE PRACTITIONER

## 2019-10-31 PROCEDURE — 36415 COLL VENOUS BLD VENIPUNCTURE: CPT | Performed by: NURSE PRACTITIONER

## 2019-10-31 PROCEDURE — 84156 ASSAY OF PROTEIN URINE: CPT | Performed by: NURSE PRACTITIONER

## 2019-10-31 PROCEDURE — 82043 UR ALBUMIN QUANTITATIVE: CPT | Performed by: NURSE PRACTITIONER

## 2019-10-31 PROCEDURE — 99214 OFFICE O/P EST MOD 30 MIN: CPT | Performed by: NURSE PRACTITIONER

## 2019-10-31 NOTE — PROGRESS NOTES
Subjective    Betty Doe is a 19 y.o. female. she is here today for follow-up.    History of Present Illness     Reason - diabetes mellitus       Patient has had a lot of trouble with controlling her BG levels while at school; she has been near DKA at least two times and states was able to push fluids and run temporary basal on her pump to correct the ketones     She is doing better -- states bg levels are getting back to normal       Duration/Timing:  Diabetes mellitus type 1, Age at onset of diabetes: 5 years, Onset of symptoms sudden  timing - constant  quality - better controlled--   severity -high due to history of DKA      Severity (Complications/Hospitalizations)  Secondary Macrovascular Complications:  No CAD, No CVA, No PAD  Secondary Microvascular Complications:  No Diabetic Nephropathy, Proteinuria, No Diabetic Retinopathy, No Diabetic Neuropathy:  Patient has had DKA     Context     Diabetes Regimen: Insulin                    Lab Results   Component Value Date     HGBA1C 9.1 (H) 06/26/2018               Blood Glucose Readings          Dexcom -- currently out of sensors    Brings log book     BG variable from 86 up to 335       Tandem insulin pump       Exercise:  Exercises     Associated Signs/Symptoms           Hyperglycemic symptoms - no polyuria, polydipsia, polyphagia         Hypoglycemic Episodes:  Documented symptomatic hypoglycemia, Nocturnal hypoglycemia          The following portions of the patient's history were reviewed and updated as appropriate:   Past Medical History:   Diagnosis Date   • Anxiety state    • Asthma    • Dehydration    • Diabetic renal disease (CMS/HCC)    • Dysfunctional grieving    • Knee pain    • Proteinuria    • Tachycardia    • Type 1 diabetes mellitus without complication (CMS/HCC)      History reviewed. No pertinent surgical history.  Family History   Problem Relation Age of Onset   • Cancer Other    • Diabetes Other    • Hypertension Other    • Stroke  "Other    • Heart disease Other    • Other Other         gallstones, colon problems     OB History     No data available        Current Outpatient Medications   Medication Sig Dispense Refill   • clonazePAM (KlonoPIN) 0.5 MG tablet Take 1 tablet by mouth 2 (Two) Times a Day As Needed for Anxiety. 60 tablet 0   • EASY TOUCH INSULIN SYRINGE 31G X 5/16\" 1 ML misc Inject 3 times daily 150 each 11   • fexofenadine-pseudoephedrine (ALLEGRA-D ALLERGY & CONGESTION) 180-240 MG per 24 hr tablet Take 1 tablet by mouth Daily. 30 tablet 0   • Insulin Admin Supplies (PUMPING INSULIN BOOK VOLUME 2) misc      • insulin aspart (NOVOLOG) 100 UNIT/ML injection  UNITS DAILY THROUGH INSULIN PUMP 40 mL 5   • insulin lispro (humaLOG) 100 UNIT/ML injection Use up to 120 units daily through insulin pump 40 mL 11   • ONETOUCH DELICA LANCETS 33G misc USE TO TEST FOUR TIMES DAILY 150 each 11     No current facility-administered medications for this visit.      Allergies   Allergen Reactions   • Aztreonam Itching and Rash   • Cephalosporins Anaphylaxis   • Penicillins Rash     Social History     Socioeconomic History   • Marital status: Single     Spouse name: Not on file   • Number of children: Not on file   • Years of education: Not on file   • Highest education level: Not on file   Tobacco Use   • Smoking status: Never Smoker   • Smokeless tobacco: Never Used   Substance and Sexual Activity   • Alcohol use: No   • Drug use: No   • Sexual activity: Defer       Review of Systems  Review of Systems   Constitutional: Negative for activity change, appetite change, diaphoresis and fatigue.   HENT: Negative for facial swelling, sneezing, sore throat, tinnitus, trouble swallowing and voice change.    Eyes: Negative for photophobia, pain, discharge, redness, itching and visual disturbance.   Respiratory: Negative for apnea, cough, choking, chest tightness and shortness of breath.    Cardiovascular: Negative for chest pain, palpitations and leg " "swelling.   Gastrointestinal: Negative for abdominal distention, abdominal pain, constipation, diarrhea, nausea and vomiting.   Endocrine: Negative for cold intolerance, heat intolerance, polydipsia, polyphagia and polyuria.   Genitourinary: Negative for difficulty urinating, dysuria, frequency, hematuria and urgency.   Musculoskeletal: Negative for arthralgias, back pain, gait problem, joint swelling, myalgias, neck pain and neck stiffness.   Skin: Negative for color change, pallor, rash and wound.   Neurological: Negative for dizziness, tremors, weakness, light-headedness, numbness and headaches.   Hematological: Negative for adenopathy. Does not bruise/bleed easily.   Psychiatric/Behavioral: Negative for behavioral problems, confusion and sleep disturbance.        Objective    /82   Pulse 120   Ht 154.9 cm (61\")   Wt 95.1 kg (209 lb 9.6 oz)   SpO2 98%   BMI 39.60 kg/m²   Physical Exam   Constitutional: She is oriented to person, place, and time. She appears well-developed and well-nourished. No distress.   HENT:   Head: Normocephalic and atraumatic.   Right Ear: External ear normal.   Left Ear: External ear normal.   Nose: Nose normal.   Eyes: Conjunctivae and EOM are normal. Pupils are equal, round, and reactive to light.   Neck: Normal range of motion. Neck supple. No tracheal deviation present. No thyromegaly present.   Cardiovascular: Normal rate, regular rhythm and normal heart sounds.   No murmur heard.  Pulmonary/Chest: Effort normal and breath sounds normal. No respiratory distress. She has no wheezes.   Abdominal: Soft. Bowel sounds are normal. There is no tenderness. There is no rebound and no guarding.   Musculoskeletal: Normal range of motion. She exhibits no edema, tenderness or deformity.   Neurological: She is alert and oriented to person, place, and time. No cranial nerve deficit.   Skin: Skin is warm and dry. No rash noted.   Psychiatric: She has a normal mood and affect. Her behavior " "is normal. Judgment and thought content normal.       Lab Review  Glucose   Date Value   06/26/2018 330 mg/dL (H)   03/28/2014 121 mg/dl (H)   03/28/2014 182 mg/dl (H)   03/28/2014 219 mg/dl (C)     Sodium (mmol/L)   Date Value   06/26/2018 138   03/28/2014 138   03/28/2014 136   03/28/2014 134 (L)     Potassium (mmol/L)   Date Value   06/26/2018 4.3   03/28/2014 3.9   03/28/2014 3.3 (L)   03/28/2014 3.3 (L)     Chloride (mmol/L)   Date Value   06/26/2018 100   03/28/2014 111 (H)   03/28/2014 109   03/28/2014 108     CO2 (mmol/L)   Date Value   06/26/2018 26.0   03/28/2014 17 (L)   03/28/2014 18 (L)   03/28/2014 15 (L)     BUN   Date Value   06/26/2018 14 mg/dL   03/28/2014 7 mg/dl (L)   03/28/2014 7 mg/dl (L)   03/28/2014 7 mg/dl (L)     Creatinine   Date Value   06/26/2018 0.58 mg/dL   03/28/2014 0.3 mg/dl (L)   03/28/2014 0.3 mg/dl (L)   03/28/2014 0.3 mg/dl (L)     Hemoglobin A1C   Date Value   06/26/2018 9.1 % (H)   03/27/2014 13.4 %TotHgb (H)       Assessment/Plan      1. Type 1 diabetes mellitus with hyperglycemia (CMS/[facility])    2. Proteinuria, unspecified type    3. Morbidly obese (CMS/[facility])    .    Medications prescribed:  Outpatient Encounter Medications as of 10/31/2019   Medication Sig Dispense Refill   • clonazePAM (KlonoPIN) 0.5 MG tablet Take 1 tablet by mouth 2 (Two) Times a Day As Needed for Anxiety. 60 tablet 0   • EASY TOUCH INSULIN SYRINGE 31G X 5/16\" 1 ML misc Inject 3 times daily 150 each 11   • fexofenadine-pseudoephedrine (ALLEGRA-D ALLERGY & CONGESTION) 180-240 MG per 24 hr tablet Take 1 tablet by mouth Daily. 30 tablet 0   • Insulin Admin Supplies (PUMPING INSULIN BOOK VOLUME 2) misc      • insulin aspart (NOVOLOG) 100 UNIT/ML injection  UNITS DAILY THROUGH INSULIN PUMP 40 mL 5   • insulin lispro (humaLOG) 100 UNIT/ML injection Use up to 120 units daily through insulin pump 40 mL 11   • ONETOUCH DELICA LANCETS 33G misc USE TO TEST FOUR TIMES DAILY 150 each 11   • [DISCONTINUED] insulin " lispro (humaLOG) 100 UNIT/ML injection Use up to 120 units daily through insulin pump 40 mL 0     No facility-administered encounter medications on file as of 10/31/2019.        Orders placed during this encounter include:  Orders Placed This Encounter   Procedures   • Hemoglobin A1c   • TSH   • Comprehensive Metabolic Panel     Glycemic Management               Lab Results   Component Value Date     HGBA1C 9.1 (H) 06/26/2018         can only use Novolog      She has Humalog and is going to try it since her reaction was 8 years ago            Previous regimen     -Basaglar  42 units            Novolog   ---        Do 1 unit per 8 of CHO for snacks            Now back on tandem insulin pump        BASAL     MN -1.0  1:30 am - 1.85    7:30 am - 1.65    7:30 pm - 1.65           Carb ratio     MN - 5     Sensitivity     50          target bg 115-120        She does have the dexcom                  Microvascular Complication Monitoring     had h o proteinuria and on lisinopril stopped the lisinopril     need to repeat urine protein       She will do lab work today         -----------------------------------------      Weight Management    Patient's Body mass index is 39.6 kg/m². BMI is within normal parameters. No follow-up required.        Decrease caloric intake by 500 calories per day     4. Follow-up: Return in about 3 months (around 1/31/2020) for Recheck.

## 2019-11-05 ENCOUNTER — TELEPHONE (OUTPATIENT)
Dept: ENDOCRINOLOGY | Facility: CLINIC | Age: 19
End: 2019-11-05

## 2019-11-06 ENCOUNTER — TELEPHONE (OUTPATIENT)
Dept: FAMILY MEDICINE CLINIC | Facility: CLINIC | Age: 19
End: 2019-11-06

## 2019-11-06 RX ORDER — PROCHLORPERAZINE 25 MG/1
1 SUPPOSITORY RECTAL ONCE
Qty: 1 EACH | Refills: 3 | Status: SHIPPED | OUTPATIENT
Start: 2019-11-06 | End: 2021-01-13

## 2019-11-06 RX ORDER — PROCHLORPERAZINE 25 MG/1
1 SUPPOSITORY RECTAL ONCE
Qty: 1 DEVICE | Refills: 1 | Status: SHIPPED | OUTPATIENT
Start: 2019-11-06 | End: 2019-11-06 | Stop reason: SDUPTHER

## 2019-11-06 RX ORDER — PROCHLORPERAZINE 25 MG/1
1 SUPPOSITORY RECTAL AS NEEDED
Qty: 9 EACH | Refills: 3 | Status: SHIPPED | OUTPATIENT
Start: 2019-11-06 | End: 2021-01-13

## 2019-11-06 RX ORDER — PROCHLORPERAZINE 25 MG/1
1 SUPPOSITORY RECTAL ONCE
Qty: 1 EACH | Refills: 3 | Status: SHIPPED | OUTPATIENT
Start: 2019-11-06 | End: 2019-11-06 | Stop reason: SDUPTHER

## 2019-11-06 RX ORDER — PROCHLORPERAZINE 25 MG/1
1 SUPPOSITORY RECTAL AS NEEDED
Qty: 9 EACH | Refills: 3 | Status: SHIPPED | OUTPATIENT
Start: 2019-11-06 | End: 2019-11-06 | Stop reason: SDUPTHER

## 2019-11-06 RX ORDER — PROCHLORPERAZINE 25 MG/1
1 SUPPOSITORY RECTAL ONCE
Qty: 1 DEVICE | Refills: 1 | Status: SHIPPED | OUTPATIENT
Start: 2019-11-06 | End: 2019-11-06

## 2019-11-06 NOTE — TELEPHONE ENCOUNTER
Please call pt mother about a fax that was supposed to have been received from [email or web address]. Her mom is on release for our office. Thanks!

## 2020-02-18 RX ORDER — INSULIN GLARGINE 100 [IU]/ML
INJECTION, SOLUTION SUBCUTANEOUS
Qty: 4 PEN | Refills: 2 | Status: SHIPPED | OUTPATIENT
Start: 2020-02-18

## 2020-02-18 RX ORDER — PEN NEEDLE, DIABETIC 30 GX5/16"
1 NEEDLE, DISPOSABLE MISCELLANEOUS DAILY
Qty: 30 EACH | Refills: 2 | Status: SHIPPED | OUTPATIENT
Start: 2020-02-18

## 2020-02-27 RX ORDER — INSULIN LISPRO 100 [IU]/ML
INJECTION, SOLUTION INTRAVENOUS; SUBCUTANEOUS
Qty: 6 PEN | Refills: 2 | Status: SHIPPED | OUTPATIENT
Start: 2020-02-27

## 2020-03-12 ENCOUNTER — TELEPHONE (OUTPATIENT)
Dept: FAMILY MEDICINE CLINIC | Facility: CLINIC | Age: 20
End: 2020-03-12

## 2020-03-12 NOTE — TELEPHONE ENCOUNTER
[facility] LEFT A VM THAT THEY ARE WAITING ON OFFICE NOTES AND A CMN THAT WAS FAXED TO THE OFFICE. STATES IT NEEDS TO BE FAXED BACK -[phone]. CALL [facility] [phone] WITH ANY QUESTIONS

## 2020-03-16 ENCOUNTER — TELEPHONE (OUTPATIENT)
Dept: ENDOCRINOLOGY | Facility: CLINIC | Age: 20
End: 2020-03-16

## 2020-07-27 ENCOUNTER — TELEPHONE (OUTPATIENT)
Dept: ENDOCRINOLOGY | Facility: CLINIC | Age: 20
End: 2020-07-27

## 2020-07-27 NOTE — TELEPHONE ENCOUNTER
Emerita with [facility]  Calling about insulin pump supplies needs a script for     Cartridges and infusion sets changing every 2 days      [phone]  Fx [phone]    Thank You

## 2020-09-17 ENCOUNTER — TELEPHONE (OUTPATIENT)
Dept: ENDOCRINOLOGY | Facility: CLINIC | Age: 20
End: 2020-09-17

## 2020-09-17 NOTE — TELEPHONE ENCOUNTER
PA for dexacom sensors were denied and is at [employer] school and needs us to persue the Pa approval [id number] She said Stefan was aware of her situation.     Thank You

## 2020-09-17 NOTE — TELEPHONE ENCOUNTER
Spoke with pt. Will get pa for dexcom sensor and transmitter.   Told pt will send refills but see it's been almost one year since in office - so will need appt before new scripts.

## 2020-09-18 ENCOUNTER — TELEMEDICINE (OUTPATIENT)
Dept: ENDOCRINOLOGY | Facility: CLINIC | Age: 20
End: 2020-09-18

## 2020-09-18 ENCOUNTER — TELEPHONE (OUTPATIENT)
Dept: ENDOCRINOLOGY | Facility: CLINIC | Age: 20
End: 2020-09-18

## 2020-09-18 DIAGNOSIS — R94.6 ABNORMAL THYROID FUNCTION TEST: ICD-10-CM

## 2020-09-18 DIAGNOSIS — E27.0 ADRENAL CORTEX HYPERFUNCTION (HCC): ICD-10-CM

## 2020-09-18 DIAGNOSIS — E10.9 TYPE 1 DIABETES MELLITUS WITHOUT COMPLICATION (HCC): Primary | ICD-10-CM

## 2020-09-18 PROCEDURE — 95251 CONT GLUC MNTR ANALYSIS I&R: CPT | Performed by: NURSE PRACTITIONER

## 2020-09-18 PROCEDURE — 99214 OFFICE O/P EST MOD 30 MIN: CPT | Performed by: NURSE PRACTITIONER

## 2020-09-18 RX ORDER — DEXAMETHASONE 1 MG
TABLET ORAL
Qty: 1 TABLET | Refills: 0 | Status: SHIPPED | OUTPATIENT
Start: 2020-09-18

## 2020-09-18 NOTE — TELEPHONE ENCOUNTER
"Pt called to let us know she is needing the labs faxed ,wanted me to forward her [employer] message:     \"I just called the lab at [facility]. They have no decided a fax from you yet. The updated fax number is [phone]. Thank you for all your help. I really appreciate it.      Betty\"   "

## 2020-09-18 NOTE — TELEPHONE ENCOUNTER
[facility] PA dept. Calling about Dexacom G6  . Needs clinical questions answered in order to process the order   1 .  If patient motivated and knowledgeable about the use CGM   2 Adhearant to treatment plan and participates in ongoing treatment and support     I3 s the pt regularly monitoring glucose 3 or more times daily      [phone] ref# [id number]     Thank You

## 2020-09-18 NOTE — PROGRESS NOTES
Subjective    Betty Reyes is a 20 y.o. female. she is here today for follow-up.    History of Present Illness       You have chosen to receive care through a telehealth visit.  Do you consent to use a video/audio connection for your medical care today? Yes            TELEHEALTH VIDEO VISIT     This a video visit due to [facility] [place] current guidelines for social distancing due to the COVID 19 pandemic    History of Present Illness      Reason - diabetes mellitus type 1                 Duration/Timing:  Diabetes mellitus type 1, Age at onset of diabetes: 5 years, Onset of symptoms sudden  timing - constant  quality - better controlled--   severity -high due to history of DKA      Severity (Complications/Hospitalizations)  Secondary Macrovascular Complications:  No CAD, No CVA, No PAD  Secondary Microvascular Complications:  No Diabetic Nephropathy, Proteinuria, No Diabetic Retinopathy, No Diabetic Neuropathy:  Patient has had DKA     Context     Diabetes Regimen: Insulin                        Lab Results   Component Value Date     HGBA1C 9.1 (H) 06/26/2018               Blood Glucose Readings    Patient was checking 4 times daily before the dexcom; now since using dexcom she is able to check more frequently and having less hypoglycemia                   Exercise:  Exercises     Associated Signs/Symptoms           Hyperglycemic symptoms - no polyuria, polydipsia, polyphagia         Hypoglycemic Episodes:  Documented symptomatic hypoglycemia, Nocturnal hypoglycemia            The following portions of the patient's history were reviewed and updated as appropriate:   Past Medical History:   Diagnosis Date   • Anxiety state    • Asthma    • Dehydration    • Diabetic renal disease (CMS/HCC)    • Dysfunctional grieving    • Knee pain    • Proteinuria    • Tachycardia    • Type 1 diabetes mellitus without complication (CMS/HCC)      History reviewed. No pertinent surgical history.  Family History   Problem Relation Age of  "Onset   • Cancer Other    • Diabetes Other    • Hypertension Other    • Stroke Other    • Heart disease Other    • Other Other         gallstones, colon problems     OB History    No obstetric history on file.       Current Outpatient Medications   Medication Sig Dispense Refill   • clonazePAM (KlonoPIN) 0.5 MG tablet Take 1 tablet by mouth 2 (Two) Times a Day As Needed for Anxiety. 60 tablet 0   • Continuous Blood Gluc Sensor (DEXCOM G6 SENSOR) 1 each As Needed (glucose control). Every 10 days 9 each 3   • dexamethasone (DECADRON) 1 MG tablet Take at 11 pm and do lab at 8 am the next morning 1 tablet 0   • EASY TOUCH INSULIN SYRINGE 31G X 5/16\" 1 ML misc Inject 3 times daily 150 each 11   • fexofenadine-pseudoephedrine (ALLEGRA-D ALLERGY & CONGESTION) 180-240 MG per 24 hr tablet Take 1 tablet by mouth Daily. 30 tablet 0   • Insulin Admin Supplies (PUMPING INSULIN BOOK VOLUME 2) misc      • Insulin Glargine (BASAGLAR KWIKPEN) 100 UNIT/ML injection pen Use 42 units daily (when off pump). 4 pen 2   • insulin lispro (humaLOG) 100 UNIT/ML injection Use up to 120 units daily through insulin pump 40 mL 11   • Insulin Lispro, 1 Unit Dial, (HUMALOG KWIKPEN) 100 UNIT/ML solution pen-injector Inject up to 40 units with meals three times daily. 6 pen 2   • Insulin Pen Needle (PEN NEEDLES 3/16\") 31G X 5 MM misc 1 each Daily. 30 each 2   • ONETOUCH DELICA LANCETS 33G misc USE TO TEST FOUR TIMES DAILY 150 each 11     No current facility-administered medications for this visit.      Allergies   Allergen Reactions   • Aztreonam Itching and Rash   • Cephalosporins Anaphylaxis   • Penicillins Rash     Social History     Socioeconomic History   • Marital status:      Spouse name: Not on file   • Number of children: Not on file   • Years of education: Not on file   • Highest education level: Not on file   Tobacco Use   • Smoking status: Never Smoker   • Smokeless tobacco: Never Used   Substance and Sexual Activity   • Alcohol " use: No   • Drug use: No   • Sexual activity: Defer       Review of Systems  Review of Systems   Constitutional: Negative for activity change, appetite change, diaphoresis and fatigue.   HENT: Negative for facial swelling, sneezing, sore throat, tinnitus, trouble swallowing and voice change.    Eyes: Negative for photophobia, pain, discharge, redness, itching and visual disturbance.   Respiratory: Negative for apnea, cough, choking, chest tightness and shortness of breath.    Cardiovascular: Negative for chest pain, palpitations and leg swelling.   Gastrointestinal: Negative for abdominal distention, abdominal pain, constipation, diarrhea, nausea and vomiting.   Endocrine: Negative for cold intolerance, heat intolerance, polydipsia, polyphagia and polyuria.   Genitourinary: Negative for difficulty urinating, dysuria, frequency, hematuria and urgency.   Musculoskeletal: Negative for arthralgias, back pain, gait problem, joint swelling, myalgias, neck pain and neck stiffness.   Skin: Negative for color change, pallor, rash and wound.   Neurological: Negative for dizziness, tremors, weakness, light-headedness, numbness and headaches.   Hematological: Negative for adenopathy. Does not bruise/bleed easily.   Psychiatric/Behavioral: Negative for behavioral problems, confusion and sleep disturbance.        Objective    There were no vitals taken for this visit.  Physical Exam  Constitutional:       Appearance: Normal appearance.   HENT:      Head: Normocephalic and atraumatic.      Right Ear: External ear normal.      Left Ear: External ear normal.      Nose: Nose normal.   Eyes:      Extraocular Movements: Extraocular movements intact.      Conjunctiva/sclera: Conjunctivae normal.      Pupils: Pupils are equal, round, and reactive to light.   Neck:      Musculoskeletal: Normal range of motion.   Pulmonary:      Effort: Pulmonary effort is normal. No respiratory distress.   Musculoskeletal: Normal range of motion.  "  Skin:     Coloration: Skin is not pale.   Neurological:      General: No focal deficit present.      Mental Status: She is alert and oriented to person, place, and time.   Psychiatric:         Mood and Affect: Mood normal.         Behavior: Behavior normal.         Thought Content: Thought content normal.         Judgment: Judgment normal.         Lab Review  Glucose   Date Value   06/26/2018 330 mg/dL (H)   03/28/2014 121 mg/dl (H)   03/28/2014 182 mg/dl (H)   03/28/2014 219 mg/dl (C)     Sodium (mmol/L)   Date Value   06/26/2018 138   03/28/2014 138   03/28/2014 136   03/28/2014 134 (L)     Potassium (mmol/L)   Date Value   06/26/2018 4.3   03/28/2014 3.9   03/28/2014 3.3 (L)   03/28/2014 3.3 (L)     Chloride (mmol/L)   Date Value   06/26/2018 100   03/28/2014 111 (H)   03/28/2014 109   03/28/2014 108     CO2 (mmol/L)   Date Value   06/26/2018 26.0   03/28/2014 17 (L)   03/28/2014 18 (L)   03/28/2014 15 (L)     BUN   Date Value   06/26/2018 14 mg/dL   03/28/2014 7 mg/dl (L)   03/28/2014 7 mg/dl (L)   03/28/2014 7 mg/dl (L)     Creatinine   Date Value   06/26/2018 0.58 mg/dL   03/28/2014 0.3 mg/dl (L)   03/28/2014 0.3 mg/dl (L)   03/28/2014 0.3 mg/dl (L)     Hemoglobin A1C   Date Value   01/22/2020 9.7 % (H)   06/26/2018 9.1 % (H)   03/27/2014 13.4 %TotHgb (H)       Assessment/Plan      1. Type 1 diabetes mellitus without complication (CMS/HCC)    2. 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)    .    Medications prescribed:  Outpatient Encounter Medications as of 9/18/2020   Medication Sig Dispense Refill   • clonazePAM (KlonoPIN) 0.5 MG tablet Take 1 tablet by mouth 2 (Two) Times a Day As Needed for Anxiety. 60 tablet 0   • Continuous Blood Gluc Sensor (DEXCOM G6 SENSOR) 1 each As Needed (glucose control). Every 10 days 9 each 3   • dexamethasone (DECADRON) 1 MG tablet Take at 11 pm and do lab at 8 am the next morning 1 tablet 0   • EASY TOUCH INSULIN SYRINGE 31G X 5/16\" 1 ML " "misc Inject 3 times daily 150 each 11   • fexofenadine-pseudoephedrine (ALLEGRA-D ALLERGY & CONGESTION) 180-240 MG per 24 hr tablet Take 1 tablet by mouth Daily. 30 tablet 0   • Insulin Admin Supplies (PUMPING INSULIN BOOK VOLUME 2) misc      • Insulin Glargine (BASAGLAR KWIKPEN) 100 UNIT/ML injection pen Use 42 units daily (when off pump). 4 pen 2   • insulin lispro (humaLOG) 100 UNIT/ML injection Use up to 120 units daily through insulin pump 40 mL 11   • Insulin Lispro, 1 Unit Dial, (HUMALOG KWIKPEN) 100 UNIT/ML solution pen-injector Inject up to 40 units with meals three times daily. 6 pen 2   • Insulin Pen Needle (PEN NEEDLES 3/16\") 31G X 5 MM misc 1 each Daily. 30 each 2   • ONETOUCH DELICA LANCETS 33G misc USE TO TEST FOUR TIMES DAILY 150 each 11     No facility-administered encounter medications on file as of 9/18/2020.        Orders placed during this encounter include:  Orders Placed This Encounter   Procedures   • Dexamethasone, Serum     Measure after taking dexamethasone 1 mg at 11 p.m. The night before      Standing Status:   Future     Standing Expiration Date:   9/18/2021   • Cortisol     Standing Status:   Future     Standing Expiration Date:   9/18/2021   • Hemoglobin A1c     Standing Status:   Future     Standing Expiration Date:   9/18/2021   • TSH     Standing Status:   Future     Standing Expiration Date:   9/18/2021   • Comprehensive Metabolic Panel     Standing Status:   Future     Standing Expiration Date:   9/18/2021   • CBC & Differential     Standing Status:   Future     Standing Expiration Date:   9/18/2021     Order Specific Question:   Manual Differential     Answer:   No     Glycemic Management               Lab Results   Component Value Date     HGBA1C 9.1 (H) 06/26/2018         can only use Novolog      She has Humalog and is going to try it since her reaction was 8 years ago                 Now back on tandem insulin pump with control IQ         BASAL     MN -1.0  1:30 am - 1.85   "   7:30 am - 1.65     7:30 pm - 1.65           Carb ratio     MN - 5     Sensitivity     50          target bg 115-120        She does have the dexcom  and is using it     Dexcom has allowed the patient to minimize hypoglycemia as alarms have predicted and avoided them    She also uses the arrows on the dexcom as follows:    If arrow is horizontal , she uses the predicted bolus    If the arrow is slanted up or down-- she increase or decrease by 0.5 units    If the arrow is vertical up or down - she increases or decreases by 1 unit                  approve dexcom       1. Patient is diabetic, insulin dependent  2. se checks his sugars 4 times daily with variability from 50 up to 400  3, requires insulin and uses a insulin pump  4. Based on glucose readings we make adjustments to the insulin  5. We have not achieved ideal outcomes with more information with a continuous  glucose sensor we should be able to do so  6. We see her every 2 to 3 months for diabetes management  7. Patient has hypoglycemia with unawareness  8. Patient has completed diabetes education  9. Patient has day to day variation in mealtime which confounds the degree of insulin dosing with multiple injections unless we have the CGMS that allows us to better  insulin dosing  10. Patient is able to make adjustments to insulin pump based on CGM data           Previous regimen     -Basaglar  42 units            Novolog   ---        Do 1 unit per 8 of CHO for snacks              Microvascular Complication Monitoring     had h o proteinuria and on lisinopril stopped the lisinopril     need to repeat urine protein        She will do lab work today         -----------------------------------------      Weight Management     Patient's Body mass index is 39.6 kg/m². BMI is within normal parameters. No follow-up required.         Decrease caloric intake by 500 calories per day       Weight gain / increased abdominal girth, rounding facies     Rule out  cushing     Will do lab in next week     Check tsh                 4. Follow-up: No follow-ups on file.        We spent 25 minutes including reviewing the patients electronic chart, reviewing medications, reviewing labs, active problems    I provided advice regarding diabetes management, dietary changes, adjustments of medication, self titration of insulin, refilled medications, ordering labs, future appointments    Patient was advised to contact the office if there are unanswered questions, trouble with blood glucose management, or any concerns

## 2020-09-21 NOTE — TELEPHONE ENCOUNTER
LM - I refaxed lab work. Pt needs to call insurance to see if she can get a new PA started. The first one was denied 9 mins after calling the office for follow up questions. I called to do appeal over the phone and insurance stated they could not do that.

## 2020-09-23 ENCOUNTER — TELEPHONE (OUTPATIENT)
Dept: ENDOCRINOLOGY | Facility: CLINIC | Age: 20
End: 2020-09-23

## 2020-09-25 NOTE — TELEPHONE ENCOUNTER
Spoke with pt. The first PA's for dexcom g6 sensors and transmitters were denied due to response questions not complete. Insurance denied 9 minutes after calling MD office. Resubmitted paper PAs with notes. Pt stated she will call insurance and follow up.

## 2020-10-01 DIAGNOSIS — R63.5 WEIGHT GAIN: Primary | ICD-10-CM

## 2020-10-01 DIAGNOSIS — E27.0 ADRENAL CORTEX HYPERFUNCTION (HCC): ICD-10-CM

## 2020-10-01 DIAGNOSIS — E10.65 TYPE 1 DIABETES MELLITUS WITH HYPERGLYCEMIA (HCC): ICD-10-CM

## 2020-10-01 RX ORDER — DEXAMETHASONE 1 MG
TABLET ORAL
Qty: 1 TABLET | Refills: 0 | Status: SHIPPED | OUTPATIENT
Start: 2020-10-01

## 2020-10-05 ENCOUNTER — TELEPHONE (OUTPATIENT)
Dept: ENDOCRINOLOGY | Facility: CLINIC | Age: 20
End: 2020-10-05

## 2020-10-05 NOTE — TELEPHONE ENCOUNTER
Pt called needing lab orders refaxed to [facility] at [phone], please. Says she needs these sent by Wednesday at 8am.

## 2020-10-06 ENCOUNTER — TELEPHONE (OUTPATIENT)
Dept: FAMILY MEDICINE CLINIC | Facility: CLINIC | Age: 20
End: 2020-10-06

## 2020-10-06 NOTE — TELEPHONE ENCOUNTER
Pt said her labs were not received at [facility]. Could you refax them to [phone].

## 2020-10-09 ENCOUNTER — TELEPHONE (OUTPATIENT)
Dept: ENDOCRINOLOGY | Facility: CLINIC | Age: 20
End: 2020-10-09

## 2020-10-09 NOTE — TELEPHONE ENCOUNTER
Instructed pt refaxing labs to [place] for 4th time and faxing to another number. She will call later to verify they received.   Pt stated she spoke with insurance and to refax dexcom PA request along with additional questions and chart notes.  ^faxed above info to [facility]/ATTN: Special Investigation Unit to fax [phone]

## 2020-10-12 ENCOUNTER — TELEPHONE (OUTPATIENT)
Dept: ENDOCRINOLOGY | Facility: CLINIC | Age: 20
End: 2020-10-12

## 2020-10-12 NOTE — TELEPHONE ENCOUNTER
Spoke with insurance. Answered questions on the phone - they stated could take up to 48hrs for decision.

## 2020-10-12 NOTE — TELEPHONE ENCOUNTER
Pt says needs a PA and has ins. On phone but must talk to Mayra NGUYỄN Dexacom transmitter and senor     They do not have a direct line they are working from home.        Phone number [phone] member id Dr NGUYỄN # NPI   Person can access all they need when we call     Pt is completely out her Dexacom supplies     Thank You

## 2020-10-12 NOTE — TELEPHONE ENCOUNTER
[employer] called with questions regarding the patients Dexcom PA, they are requesting a call back at  [phone] reference [id number]

## 2020-10-15 DIAGNOSIS — E27.0 ADRENAL CORTEX HYPERFUNCTION (HCC): ICD-10-CM

## 2020-10-15 DIAGNOSIS — E10.9 TYPE 1 DIABETES MELLITUS WITHOUT COMPLICATION (HCC): ICD-10-CM

## 2020-10-15 DIAGNOSIS — R94.6 ABNORMAL THYROID FUNCTION TEST: ICD-10-CM

## 2020-10-21 ENCOUNTER — TELEMEDICINE (OUTPATIENT)
Dept: ENDOCRINOLOGY | Facility: CLINIC | Age: 20
End: 2020-10-21

## 2020-10-21 DIAGNOSIS — T50.905A ADVERSE EFFECT OF DRUG, INITIAL ENCOUNTER: Primary | ICD-10-CM

## 2020-10-21 DIAGNOSIS — E10.9 TYPE 1 DIABETES MELLITUS WITHOUT COMPLICATION (HCC): ICD-10-CM

## 2020-10-21 DIAGNOSIS — R63.5 WEIGHT GAIN: ICD-10-CM

## 2020-10-21 PROCEDURE — 99214 OFFICE O/P EST MOD 30 MIN: CPT | Performed by: NURSE PRACTITIONER

## 2020-10-21 NOTE — PROGRESS NOTES
Subjective    Betty Reyes is a 20 y.o. female. she is here today for follow-up.    History of Present Illness         You have chosen to receive care through a telehealth visit.  Do you consent to use a video/audio connection for your medical care today? Yes                                                           TELEHEALTH VIDEO VISIT      This a video visit due to [facility] current guidelines for social distancing due to the COVID 19 pandemic     History of Present Illness      Reason - diabetes mellitus type 1          patient is having problems with her insulin    Insurance has required her to take Humalog and she took it years ago and had a reaction and could only take novolog     She did try the Humalog again and since starting the Humalog she has had weight gain, edema in the lower legs, shortness of breath, worsening anxiety and depression     She needs back on a novolog product           Duration/Timing:  Diabetes mellitus type 1, Age at onset of diabetes: 5 years, Onset of symptoms sudden  timing - constant  quality - better controlled--   severity -high due to history of DKA      Severity (Complications/Hospitalizations)  Secondary Macrovascular Complications:  No CAD, No CVA, No PAD  Secondary Microvascular Complications:  No Diabetic Nephropathy, Proteinuria, No Diabetic Retinopathy, No Diabetic Neuropathy:  Patient has had DKA     Context     Diabetes Regimen: Insulin     Lab Results   Component Value Date    HGBA1C 6.7 (H) 09/21/2020                   Blood Glucose Readings     Patient was checking 4 times daily before the dexcom; now since using dexcom she is able to check more frequently and having less hypoglycemia          she is on tandem pump and dexcom G6     States bg is at goal          Exercise:  Exercises     Associated Signs/Symptoms           Hyperglycemic symptoms - no polyuria, polydipsia, polyphagia         Hypoglycemic Episodes:  Documented symptomatic hypoglycemia, Nocturnal  "hypoglycemia               The following portions of the patient's history were reviewed and updated as appropriate:   Past Medical History:   Diagnosis Date   • Anxiety state    • Asthma    • Dehydration    • Diabetic renal disease (CMS/HCC)    • Dysfunctional grieving    • Knee pain    • Proteinuria    • Tachycardia    • Type 1 diabetes mellitus without complication (CMS/HCC)      History reviewed. No pertinent surgical history.  Family History   Problem Relation Age of Onset   • Cancer Other    • Diabetes Other    • Hypertension Other    • Stroke Other    • Heart disease Other    • Other Other         gallstones, colon problems     OB History    No obstetric history on file.       Current Outpatient Medications   Medication Sig Dispense Refill   • clonazePAM (KlonoPIN) 0.5 MG tablet Take 1 tablet by mouth 2 (Two) Times a Day As Needed for Anxiety. 60 tablet 0   • Continuous Blood Gluc Sensor (DEXCOM G6 SENSOR) 1 each As Needed (glucose control). Every 10 days 9 each 3   • dexamethasone (DECADRON) 1 MG tablet Take at 11 pm and do lab at 8 am the next morning 1 tablet 0   • dexamethasone (DECADRON) 1 MG tablet Take at 11 pm and do lab at 8 am the next morning 1 tablet 0   • EASY TOUCH INSULIN SYRINGE 31G X 5/16\" 1 ML misc Inject 3 times daily 150 each 11   • fexofenadine-pseudoephedrine (ALLEGRA-D ALLERGY & CONGESTION) 180-240 MG per 24 hr tablet Take 1 tablet by mouth Daily. 30 tablet 0   • Insulin Admin Supplies (PUMPING INSULIN BOOK VOLUME 2) misc      • Insulin Glargine (BASAGLAR KWIKPEN) 100 UNIT/ML injection pen Use 42 units daily (when off pump). 4 pen 2   • insulin lispro (humaLOG) 100 UNIT/ML injection Use up to 120 units daily through insulin pump 40 mL 11   • Insulin Lispro, 1 Unit Dial, (HUMALOG KWIKPEN) 100 UNIT/ML solution pen-injector Inject up to 40 units with meals three times daily. 6 pen 2   • Insulin Pen Needle (PEN NEEDLES 3/16\") 31G X 5 MM misc 1 each Daily. 30 each 2   • ONETOUCH DELICA " LANCETS 33G misc USE TO TEST FOUR TIMES DAILY 150 each 11     No current facility-administered medications for this visit.      Allergies   Allergen Reactions   • Aztreonam Itching and Rash   • Cephalosporins Anaphylaxis   • Penicillins Rash     Social History     Socioeconomic History   • Marital status:      Spouse name: Not on file   • Number of children: Not on file   • Years of education: Not on file   • Highest education level: Not on file   Tobacco Use   • Smoking status: Never Smoker   • Smokeless tobacco: Never Used   Substance and Sexual Activity   • Alcohol use: No   • Drug use: No   • Sexual activity: Defer       Review of Systems  Review of Systems   Constitutional: Negative for activity change, appetite change, diaphoresis and fatigue.   HENT: Negative for facial swelling, sneezing, sore throat, tinnitus, trouble swallowing and voice change.    Eyes: Negative for photophobia, pain, discharge, redness, itching and visual disturbance.   Respiratory: Negative for apnea, cough, choking, chest tightness and shortness of breath.    Cardiovascular: Negative for chest pain, palpitations and leg swelling.   Gastrointestinal: Negative for abdominal distention, abdominal pain, constipation, diarrhea, nausea and vomiting.   Endocrine: Negative for cold intolerance, heat intolerance, polydipsia, polyphagia and polyuria.   Genitourinary: Negative for difficulty urinating, dysuria, frequency, hematuria and urgency.   Musculoskeletal: Negative for arthralgias, back pain, gait problem, joint swelling, myalgias, neck pain and neck stiffness.   Skin: Negative for color change, pallor, rash and wound.   Neurological: Negative for dizziness, tremors, weakness, light-headedness, numbness and headaches.   Hematological: Negative for adenopathy. Does not bruise/bleed easily.   Psychiatric/Behavioral: Negative for behavioral problems, confusion and sleep disturbance.        Objective    There were no vitals taken for  this visit.  Physical Exam  Constitutional:       Appearance: Normal appearance.   HENT:      Head: Normocephalic and atraumatic.      Right Ear: External ear normal.      Left Ear: External ear normal.      Nose: Nose normal.   Eyes:      Extraocular Movements: Extraocular movements intact.      Conjunctiva/sclera: Conjunctivae normal.      Pupils: Pupils are equal, round, and reactive to light.   Neck:      Musculoskeletal: Normal range of motion.   Pulmonary:      Effort: Pulmonary effort is normal. No respiratory distress.   Musculoskeletal: Normal range of motion.   Skin:     Coloration: Skin is not pale.   Neurological:      General: No focal deficit present.      Mental Status: She is alert and oriented to person, place, and time.   Psychiatric:         Mood and Affect: Mood normal.         Behavior: Behavior normal.         Thought Content: Thought content normal.         Judgment: Judgment normal.         Lab Review  Glucose   Date Value   06/26/2018 330 mg/dL (H)   03/28/2014 121 mg/dl (H)   03/28/2014 182 mg/dl (H)   03/28/2014 219 mg/dl (C)     Sodium (mmol/L)   Date Value   06/26/2018 138   03/28/2014 138   03/28/2014 136   03/28/2014 134 (L)     Potassium (mmol/L)   Date Value   06/26/2018 4.3   03/28/2014 3.9   03/28/2014 3.3 (L)   03/28/2014 3.3 (L)     Chloride (mmol/L)   Date Value   06/26/2018 100   03/28/2014 111 (H)   03/28/2014 109   03/28/2014 108     CO2 (mmol/L)   Date Value   06/26/2018 26.0   03/28/2014 17 (L)   03/28/2014 18 (L)   03/28/2014 15 (L)     BUN   Date Value   06/26/2018 14 mg/dL   03/28/2014 7 mg/dl (L)   03/28/2014 7 mg/dl (L)   03/28/2014 7 mg/dl (L)     Creatinine   Date Value   06/26/2018 0.58 mg/dL   03/28/2014 0.3 mg/dl (L)   03/28/2014 0.3 mg/dl (L)   03/28/2014 0.3 mg/dl (L)     Hemoglobin A1C   Date Value   09/21/2020 6.7 % (H)   01/22/2020 9.7 % (H)   06/26/2018 9.1 % (H)   03/27/2014 13.4 %[facility] (H)       Assessment/Plan      1. Adverse effect of drug, initial  "encounter    2. Type 1 diabetes mellitus without complication (CMS/[facility])    3. Weight gain    .    Medications prescribed:  Outpatient Encounter Medications as of 10/21/2020   Medication Sig Dispense Refill   • clonazePAM (KlonoPIN) 0.5 MG tablet Take 1 tablet by mouth 2 (Two) Times a Day As Needed for Anxiety. 60 tablet 0   • Continuous Blood Gluc Sensor (DEXCOM G6 SENSOR) 1 each As Needed (glucose control). Every 10 days 9 each 3   • dexamethasone (DECADRON) 1 MG tablet Take at 11 pm and do lab at 8 am the next morning 1 tablet 0   • dexamethasone (DECADRON) 1 MG tablet Take at 11 pm and do lab at 8 am the next morning 1 tablet 0   • EASY TOUCH INSULIN SYRINGE 31G X 5/16\" 1 ML misc Inject 3 times daily 150 each 11   • fexofenadine-pseudoephedrine (ALLEGRA-D ALLERGY & CONGESTION) 180-240 MG per 24 hr tablet Take 1 tablet by mouth Daily. 30 tablet 0   • Insulin Admin Supplies (PUMPING INSULIN BOOK VOLUME 2) misc      • Insulin Glargine (BASAGLAR KWIKPEN) 100 UNIT/ML injection pen Use 42 units daily (when off pump). 4 pen 2   • insulin lispro (humaLOG) 100 UNIT/ML injection Use up to 120 units daily through insulin pump 40 mL 11   • Insulin Lispro, 1 Unit Dial, (HUMALOG KWIKPEN) 100 UNIT/ML solution pen-injector Inject up to 40 units with meals three times daily. 6 pen 2   • Insulin Pen Needle (PEN NEEDLES 3/16\") 31G X 5 MM misc 1 each Daily. 30 each 2   • ONETOUCH DELICA LANCETS 33G misc USE TO TEST FOUR TIMES DAILY 150 each 11     No facility-administered encounter medications on file as of 10/21/2020.        Orders placed during this encounter include:  No orders of the defined types were placed in this encounter.      Glycemic Management      Lab Results   Component Value Date    HGBA1C 6.7 (H) 09/21/2020             can only use Novolog      She has Humalog and is going to try it since her reaction was 8 years ago --she has tried the Humalog and having a severe reaction     She has gained weight, has edema in her " legs, shortness of breath worsening anxiety and depression     We have samples of Fiasp in the office she is going to try this insulin and see if symptoms improve    We will appeal the insurance decision -- she did try the humalog again and could not take it. She needs to be on novolog                  Now back on tandem insulin pump with control IQ         BASAL     MN -1.0  1:30 am - 1.85     7:30 am - 1.65     7:30 pm - 1.65           Carb ratio     MN - 5     Sensitivity     50          target bg 115-120        She does have the dexcom  and is using it      Dexcom has allowed the patient to minimize hypoglycemia as alarms have predicted and avoided them     She also uses the arrows on the dexcom as follows:     If arrow is horizontal , she uses the predicted bolus     If the arrow is slanted up or down-- she increase or decrease by 0.5 units     If the arrow is vertical up or down - she increases or decreases by 1 unit               approve dexcom         1. Patient is diabetic, insulin dependent  2. se checks his sugars 4 times daily with variability from 50 up to 400  3, requires insulin and uses a insulin pump  4. Based on glucose readings we make adjustments to the insulin  5. We have not achieved ideal outcomes with more information with a continuous  glucose sensor we should be able to do so  6. We see her every 2 to 3 months for diabetes management  7. Patient has hypoglycemia with unawareness  8. Patient has completed diabetes education  9. Patient has day to day variation in mealtime which confounds the degree of insulin dosing with multiple injections unless we have the CGMS that allows us to better  insulin dosing  10. Patient is able to make adjustments to insulin pump based on CGM data            Previous regimen     -Basaglar  42 units            Novolog   ---        Do 1 unit per 8 of CHO for snacks                  Microvascular Complication Monitoring     had h o proteinuria and on  lisinopril stopped the lisinopril     need to repeat urine protein        She will do lab work today         -----------------------------------------      Weight Management     Patient's Body mass index is 39.6 kg/m². BMI is within normal parameters. No follow-up required.         Decrease caloric intake by 500 calories per day         Weight gain     We ruled out cushing , the LDDST had a normal suppression - cortisol was 0.8       she believes it is the Humalog causing the weight gain and edema               4. Follow-up: No follow-ups on file.        Follow up one day after changing to fiasp    We spent 25 minutes including reviewing the patients electronic chart, reviewing medications, reviewing labs, active problems    I provided advice regarding diabetes management, dietary changes, adjustments of medication, self titration of insulin, refilled medications, ordering labs, future appointments    Patient was advised to contact the office if there are unanswered questions, trouble with blood glucose management, or any concerns

## 2020-10-22 DIAGNOSIS — E27.0 ADRENAL CORTEX HYPERFUNCTION (HCC): ICD-10-CM

## 2020-10-22 DIAGNOSIS — R63.5 WEIGHT GAIN: ICD-10-CM

## 2020-10-22 DIAGNOSIS — E10.65 TYPE 1 DIABETES MELLITUS WITH HYPERGLYCEMIA (HCC): ICD-10-CM

## 2020-10-27 RX ORDER — NAPROXEN SODIUM 220 MG
TABLET ORAL
Qty: 100 EACH | Refills: 11 | Status: SHIPPED | OUTPATIENT
Start: 2020-10-27

## 2020-11-05 RX ORDER — INSULIN ASPART INJECTION 100 [IU]/ML
100 INJECTION, SOLUTION SUBCUTANEOUS DAILY
Qty: 30 ML | Refills: 11 | Status: SHIPPED | OUTPATIENT
Start: 2020-11-05 | End: 2021-02-18

## 2020-11-10 ENCOUNTER — DOCUMENTATION (OUTPATIENT)
Dept: ENDOCRINOLOGY | Facility: CLINIC | Age: 20
End: 2020-11-10

## 2021-01-13 RX ORDER — PROCHLORPERAZINE 25 MG/1
SUPPOSITORY RECTAL
Qty: 9 EACH | Refills: 3 | Status: SHIPPED | OUTPATIENT
Start: 2021-01-13 | End: 2023-04-04

## 2021-01-13 RX ORDER — PROCHLORPERAZINE 25 MG/1
SUPPOSITORY RECTAL
Qty: 1 EACH | Refills: 3 | Status: SHIPPED | OUTPATIENT
Start: 2021-01-13

## 2021-02-17 RX ORDER — INSULIN GLARGINE 100 [IU]/ML
INJECTION, SOLUTION SUBCUTANEOUS
Qty: 10 ML | Refills: 5 | Status: SHIPPED | OUTPATIENT
Start: 2021-02-17

## 2021-02-18 RX ORDER — INSULIN ASPART INJECTION 100 [IU]/ML
INJECTION, SOLUTION SUBCUTANEOUS
Qty: 30 ML | Refills: 11 | Status: SHIPPED | OUTPATIENT
Start: 2021-02-18 | End: 2021-05-19 | Stop reason: SDUPTHER

## 2021-02-22 ENCOUNTER — DOCUMENTATION (OUTPATIENT)
Dept: ENDOCRINOLOGY | Facility: CLINIC | Age: 21
End: 2021-02-22

## 2021-03-16 ENCOUNTER — DOCUMENTATION (OUTPATIENT)
Dept: ENDOCRINOLOGY | Facility: CLINIC | Age: 21
End: 2021-03-16

## 2021-05-19 RX ORDER — INSULIN ASPART INJECTION 100 [IU]/ML
100 INJECTION, SOLUTION SUBCUTANEOUS DAILY
Qty: 90 ML | Refills: 11 | Status: SHIPPED | OUTPATIENT
Start: 2021-05-19 | End: 2021-06-02

## 2021-05-27 ENCOUNTER — TELEPHONE (OUTPATIENT)
Dept: FAMILY MEDICINE CLINIC | Facility: CLINIC | Age: 21
End: 2021-05-27

## 2021-06-02 ENCOUNTER — TELEPHONE (OUTPATIENT)
Dept: ENDOCRINOLOGY | Facility: CLINIC | Age: 21
End: 2021-06-02

## 2021-06-02 NOTE — TELEPHONE ENCOUNTER
APPROVED  Key: [id number]  Insulin Aspart 100UNIT/ML solution  PA sent via [facility]     PT AWARE.    Left message for patient to call back regarding upcoming stress test. Please also complete COVID screen. Transfer patient to any available Cardiology RN for medication instructions.

## 2021-06-02 NOTE — TELEPHONE ENCOUNTER
Pt missed our call but is in job interviews cannot talk right now.    Insulin - PA must say pt can only take aspart because she is allergic to the humalog in order to get approved     Pt has only 72 hours on her pump and will be completely out     Pharmacy [facility] in [place]      Can call mom at [phone]

## 2021-08-24 ENCOUNTER — TELEMEDICINE (OUTPATIENT)
Dept: ENDOCRINOLOGY | Facility: CLINIC | Age: 21
End: 2021-08-24

## 2021-08-24 DIAGNOSIS — E66.9 OBESITY, UNSPECIFIED CLASSIFICATION, UNSPECIFIED OBESITY TYPE, UNSPECIFIED WHETHER SERIOUS COMORBIDITY PRESENT: ICD-10-CM

## 2021-08-24 DIAGNOSIS — R80.9 MICROALBUMINURIA: ICD-10-CM

## 2021-08-24 DIAGNOSIS — E10.65 TYPE 1 DIABETES MELLITUS WITH HYPERGLYCEMIA (HCC): Primary | ICD-10-CM

## 2021-08-24 PROCEDURE — 99214 OFFICE O/P EST MOD 30 MIN: CPT | Performed by: NURSE PRACTITIONER

## 2021-08-24 NOTE — PROGRESS NOTES
Chief Complaint  Diabetes    Subjective          Betty Reyes presents to [facility] END[facility]IN[facility]  History of Present Illness       You have chosen to receive care through a telehealth visit.  Do you consent to use a video/audio connection for your medical care today? Yes          TELEHEALTH VIDEO VISIT     This a video visit due to [facility] current guidelines for social distancing due to the COVID 19 pandemic      21 year old female presents for follow up     Reason diabetes mellitus type 1     Duration since age 5    Timing constant    Quality improved control     Lab Results   Component Value Date    HGBA1C 6.7 (H) 09/21/2020         Severity high due to history of DKA    Macrovascular complications none    Microvascular complications none    Current diabetes regimen     Insulin, tandem pump     Current diabetes monitoring     Checks 6 to 10 times daily     Uses dexcom G6     States dexcom shows an estimated A1c is 6.8%       States most at goal     Could not download                         Review of Systems - General ROS: negative                Objective   Vital Signs:   There were no vitals taken for this visit.    Physical Exam  Neurological:      General: No focal deficit present.      Mental Status: She is alert.   Psychiatric:         Mood and Affect: Mood normal.         Thought Content: Thought content normal.         Judgment: Judgment normal.        Result Review :   The following data was reviewed by: HENRRY Friedman on 08/24/2021:  Common labs    Common Labsle 9/21/20 1/25/21   Albumin  4.5   Total Bilirubin  0.32   Alkaline Phosphatase  85   AST (SGOT)  16   ALT (SGPT)  26   Hemoglobin A1C 6.7 (A)    (A) Abnormal value                      Assessment and Plan    Diagnoses and all orders for this visit:    1. Type 1 diabetes mellitus with hyperglycemia (CMS/HCC) (Primary)  -     CBC & Differential; Future  -     Comprehensive Metabolic Panel; Future  -      Hemoglobin A1c; Future  -     TSH; Future  -     Vitamin D 25 Hydroxy; Future  -     Microalbumin / Creatinine Urine Ratio - Urine, Clean Catch; Future  -     Protein / Creatinine Ratio, Urine - Urine, Clean Catch; Future    2. Obesity, unspecified classification, unspecified obesity type, unspecified whether serious comorbidity present    3. Microalbuminuria             Glycemic Management              Lab Results   Component Value Date     HGBA1C 6.7 (H) 09/21/2020               Now back on tandem insulin pump with control IQ         BASAL     MN -1.0  1:30 am - 1.85     7:30 am - 1.65     7:30 pm - 1.65           Carb ratio     MN - 5     Sensitivity     50          target bg 115-120      No changes today to pump     She adjust insulin based on cgm         She does have the dexcom  and is using it      Dexcom has allowed the patient to minimize hypoglycemia as alarms have predicted and avoided them     She also uses the arrows on the dexcom as follows:     If arrow is horizontal , she uses the predicted bolus     If the arrow is slanted up or down-- she increase or decrease by 0.5 units     If the arrow is vertical up or down - she increases or decreases by 1 unit          can only use Novolog      She has Humalog and is going to try it since her reaction was 8 years ago --she has tried the Humalog and having a severe reaction      She has gained weight, has edema in her legs, shortness of breath worsening anxiety and depression           We will appeal the insurance decision -- she did try the humalog again and could not take it. She needs to be on novolog                       Previous regimen     -Basaglar  42 units            Novolog   ---        Do 1 unit per 8 of CHO for snacks                  Microvascular Complication Monitoring     had h o proteinuria and on lisinopril stopped the lisinopril     need to repeat urine protein        She will do lab work  today         -----------------------------------------      Weight Management     Obesity         Decrease caloric intake by 500 calories per day         Weight gain      We ruled out cushing , the LDDST had a normal suppression - cortisol was 0.8       she is dieting and exercising and not losing weight    She is in a weight loss program     Will try wegovy once low dose available                 Follow Up   No follow-ups on file.  Patient was given instructions and counseling regarding her condition or for health maintenance advice. Please see specific information pulled into the AVS if appropriate.         This document has been electronically signed by HENRRY Friedman on August 24, 2021 15:36 CDT.

## 2021-09-27 DIAGNOSIS — E10.9 TYPE 1 DIABETES MELLITUS WITHOUT COMPLICATION (HCC): Primary | ICD-10-CM

## 2021-09-27 DIAGNOSIS — E55.9 VITAMIN D DEFICIENCY: ICD-10-CM

## 2021-09-27 DIAGNOSIS — R80.9 MICROALBUMINURIA: ICD-10-CM

## 2021-11-10 ENCOUNTER — DOCUMENTATION (OUTPATIENT)
Dept: ENDOCRINOLOGY | Facility: CLINIC | Age: 21
End: 2021-11-10

## 2021-11-22 ENCOUNTER — TELEMEDICINE (OUTPATIENT)
Dept: ENDOCRINOLOGY | Facility: CLINIC | Age: 21
End: 2021-11-22

## 2021-11-22 DIAGNOSIS — E10.65 TYPE 1 DIABETES MELLITUS WITH HYPERGLYCEMIA (HCC): Primary | ICD-10-CM

## 2021-11-22 DIAGNOSIS — E66.9 OBESITY, UNSPECIFIED CLASSIFICATION, UNSPECIFIED OBESITY TYPE, UNSPECIFIED WHETHER SERIOUS COMORBIDITY PRESENT: ICD-10-CM

## 2021-11-22 PROCEDURE — 99213 OFFICE O/P EST LOW 20 MIN: CPT | Performed by: NURSE PRACTITIONER

## 2021-11-22 RX ORDER — HYDROCHLOROTHIAZIDE 25 MG/1
25 TABLET ORAL DAILY
COMMUNITY

## 2021-11-22 RX ORDER — SEMAGLUTIDE 0.25 MG/.5ML
0.25 INJECTION, SOLUTION SUBCUTANEOUS WEEKLY
Qty: 3 ML | Refills: 0 | Status: SHIPPED | OUTPATIENT
Start: 2021-11-22

## 2021-11-22 RX ORDER — SEMAGLUTIDE 0.5 MG/.5ML
0.5 INJECTION, SOLUTION SUBCUTANEOUS WEEKLY
Qty: 3 ML | Refills: 0 | Status: SHIPPED | OUTPATIENT
Start: 2021-11-22

## 2021-11-22 RX ORDER — SERTRALINE HYDROCHLORIDE 100 MG/1
100 TABLET, FILM COATED ORAL DAILY
COMMUNITY

## 2021-12-23 ENCOUNTER — DOCUMENTATION (OUTPATIENT)
Dept: ENDOCRINOLOGY | Facility: CLINIC | Age: 21
End: 2021-12-23

## 2022-02-25 ENCOUNTER — TELEMEDICINE (OUTPATIENT)
Dept: ENDOCRINOLOGY | Facility: CLINIC | Age: 22
End: 2022-02-25

## 2022-02-25 DIAGNOSIS — E10.65 TYPE 1 DIABETES MELLITUS WITH HYPERGLYCEMIA: Primary | ICD-10-CM

## 2022-02-25 DIAGNOSIS — Z3A.15 15 WEEKS GESTATION OF PREGNANCY: ICD-10-CM

## 2022-02-25 PROCEDURE — 99213 OFFICE O/P EST LOW 20 MIN: CPT | Performed by: NURSE PRACTITIONER

## 2022-02-25 NOTE — PROGRESS NOTES
Chief Complaint  Diabetes    Subjective     {Problem List  Visit Diagnosis   Encounters  Notes  Medications  Labs  Result Review Imaging  Media :23}     Betty Reyes presents to [facility]  History of Present Illness     You have chosen to receive care through a telehealth visit.  Do you consent to use a video/audio connection for your medical care today? Yes        TELEHEALTH VIDEO VISIT     This a video visit due to [facility] current guidelines for social distancing due to the COVID 19 pandemic        21 year old female presents for follow up     Reason diabetes mellitus type 1      Duration diagnosed at age 5     She is currently pregnant --15 weeks      Timing constant      Quality near control         Lab Results   Component Value Date    HGBA1C 6.2 (H) 12/27/2021          Severity high due to history of DKA           Macrovascular complications none     Microvascular complications none           Current diabetes regimen      Insulin, omni pod            Current diabetes monitoring      Checks 6 to 10 times daily      Uses dexcom G6        90 day average 117              Objective   Vital Signs:   There were no vitals taken for this visit.    Physical Exam  Neurological:      General: No focal deficit present.      Mental Status: She is alert.   Psychiatric:         Mood and Affect: Mood normal.         Thought Content: Thought content normal.         Judgment: Judgment normal.        Result Review :   The following data was reviewed by: HENRRY Friedman on 02/25/2022:  Common labs    Common Labsle 12/27/21 12/27/21 12/27/21 2/7/22    0820 0820 0825    Glucose 104   63 (A)   BUN 10   6 (A)   Creatinine 0.7  91.9 0.5 (A)   Sodium 136   137   Potassium 4.3   3.7   Chloride 106   105   Calcium 9.1   9.5   Albumin 4.2   4.3   Total Bilirubin 0.57   0.40   Alkaline Phosphatase 67   51   AST (SGOT) 19   13   ALT (SGPT) 18   9   Hemoglobin A1C  6.2 (A)      (A) Abnormal value                      Assessment and Plan    Diagnoses and all orders for this visit:    1. Type 1 diabetes mellitus with hyperglycemia (HCC) (Primary)    2. 15 weeks gestation of pregnancy           Glycemic Management        Lab Results   Component Value Date    HGBA1C 6.2 (H) 12/27/2021          Now back on omni pod         BASAL     MN -1.0        1:30 am - 1.85     7:30 am - 1.65     7:30 pm - 1.65           Carb ratio     MN - 5     Sensitivity     50          target bg 115-120        No changes today to pump      She adjust insulin based on cgm         She does have the dexcom  and is using it      Dexcom has allowed the patient to minimize hypoglycemia as alarms have predicted and avoided them     She also uses the arrows on the dexcom as follows:     If arrow is horizontal , she uses the predicted bolus     If the arrow is slanted up or down-- she increase or decrease by 0.5 units     If the arrow is vertical up or down - she increases or decreases by 1 unit                           can only use Novolog      She has Humalog and is going to try it since her reaction was 8 years ago --she has tried the Humalog and having a severe reaction      She has gained weight, has edema in her legs, shortness of breath worsening anxiety and depression            We will appeal the insurance decision -- she did try the humalog again and could not take it. She needs to be on novolog                        Previous regimen     -Basaglar  42 units            Novolog   ---        Do 1 unit per 8 of CHO for snacks                  Microvascular Complication Monitoring     had h o proteinuria and on lisinopril stopped the lisinopril     need to repeat urine protein          Weight Management     Obesity         Decrease caloric intake by 500 calories per day         Weight gain      We ruled out cushing , the LDDST had a normal suppression - cortisol was 0.8              currently 15 weeks pregnant she is  following with maternal fetal medicine , we will just keep out 3 months appt        Follow Up   No follow-ups on file.  Patient was given instructions and counseling regarding her condition or for health maintenance advice. Please see specific information pulled into the AVS if appropriate.         This document has been electronically signed by HENRRY Friedman on February 25, 2022 08:54 CST.

## 2022-09-08 ENCOUNTER — TELEMEDICINE (OUTPATIENT)
Dept: ENDOCRINOLOGY | Facility: CLINIC | Age: 22
End: 2022-09-08

## 2022-09-08 DIAGNOSIS — E10.65 TYPE 1 DIABETES MELLITUS WITH HYPERGLYCEMIA: Primary | ICD-10-CM

## 2022-09-08 PROCEDURE — 99213 OFFICE O/P EST LOW 20 MIN: CPT | Performed by: NURSE PRACTITIONER

## 2022-09-08 NOTE — PROGRESS NOTES
Chief Complaint  Diabetes    Subjective          Betty Reyes presents to [facility]  History of Present Illness       You have chosen to receive care through a telehealth visit.  Do you consent to use a video/audio connection for your medical care today? Yes        TELEHEALTH VIDEO VISIT     This a video visit due to [facility] current guidelines for social distancing due to the COVID 19 pandemic      22 year old female presents for follow up      Reason diabetes mellitus type 1      Duration diagnosed at age 5       Delivered in July 2022      Timing constant      Quality near control              Severity high due to history of DKA           Macrovascular complications none     Microvascular complications none           Current diabetes regimen      Insulin, omni pod            Current diabetes monitoring      Checks 6 to 10 times daily      Uses dexcom G6          Review of Systems - General ROS: negative               Objective   Vital Signs:   There were no vitals taken for this visit.    Physical Exam  Neurological:      General: No focal deficit present.      Mental Status: She is alert.   Psychiatric:         Mood and Affect: Mood normal.         Thought Content: Thought content normal.         Judgment: Judgment normal.        Result Review :   The following data was reviewed by: HENRRY Friedman on 02/25/2022:  Common labs    Common Labsle 7/11/22 7/14/22 7/17/22   Glucose 103 92 93   BUN 11 10 10   Creatinine 0.6 (A) 0.6 (A) 0.6 (A)   Sodium 136 134 (A) 136   Potassium 4.0 3.9 3.7   Chloride 106 106 108 (A)   Calcium 8.6 8.1 (A) 8.3 (A)   Albumin 3.3 (A) 3.2 (A) 3.0 (A)   Total Bilirubin 0.36 0.28 (A) 0.24 (A)   Alkaline Phosphatase 108 (A) 97 96   AST (SGOT) 13 14 15   ALT (SGPT) 9 9 9   (A) Abnormal value                        Assessment and Plan    Diagnoses and all orders for this visit:    1. Type 1 diabetes mellitus with hyperglycemia (HCC)  (Primary)           Glycemic Management    Diabetes mellitus type 1             Lab Results   Component Value Date    HGBA1C 6.6 (H) 07/08/2022        omni pod and dexcom         BASAL     MN -1.0        1:30 am - 1.85     7:30 am - 1.65     7:30 pm - 1.65           Carb ratio     MN - 5     Sensitivity     50          target bg 115-120                  She does have the dexcom  and is using it      Dexcom has allowed the patient to minimize hypoglycemia as alarms have predicted and avoided them     She also uses the arrows on the dexcom as follows:     If arrow is horizontal , she uses the predicted bolus     If the arrow is slanted up or down-- she increase or decrease by 0.5 units     If the arrow is vertical up or down - she increases or decreases by 1 unit                           can only use Novolog      She has Humalog and is going to try it since her reaction was 8 years ago --she has tried the Humalog and having a severe reaction      She has gained weight, has edema in her legs, shortness of breath worsening anxiety and depression            We will appeal the insurance decision -- she did try the humalog again and could not take it. She needs to be on novolog                        Previous regimen     -Basaglar  42 units            Novolog   ---        Do 1 unit per 8 of CHO for snacks                  Microvascular Complication Monitoring     had h o proteinuria and on lisinopril stopped the lisinopril     need to repeat urine protein          Weight Management               Weight gain      We ruled out cushing , the LDDST had a normal suppression - cortisol was 0.8                   Follow Up   No follow-ups on file.  Patient was given instructions and counseling regarding her condition or for health maintenance advice. Please see specific information pulled into the AVS if appropriate.         This document has been electronically signed by HENRRY Friedman on September 8, 2022 09:26  CDT.

## 2022-09-21 ENCOUNTER — TELEPHONE (OUTPATIENT)
Dept: ENDOCRINOLOGY | Facility: CLINIC | Age: 22
End: 2022-09-21

## 2022-09-28 ENCOUNTER — TELEPHONE (OUTPATIENT)
Dept: ENDOCRINOLOGY | Facility: CLINIC | Age: 22
End: 2022-09-28

## 2022-09-28 NOTE — TELEPHONE ENCOUNTER
Pt stated she spoke with luke but needed some forms from her pcp .She will let us know if she needs anything else from Dr. Hoff.

## 2022-09-28 NOTE — TELEPHONE ENCOUNTER
Patient called stating she needs to speak with you ASAP, she said it is about her accomodation papers.     Please Advise  Thanks

## 2022-12-16 ENCOUNTER — TELEPHONE (OUTPATIENT)
Dept: ENDOCRINOLOGY | Facility: CLINIC | Age: 22
End: 2022-12-16

## 2022-12-16 NOTE — TELEPHONE ENCOUNTER
Rescheduled to March 23 from 1/5 she was unable to do a telephone on 12/30 she was upset that she was unable to get an appointment sooner but she was added to the waitlist and was told to call our office to check and see if we have any cancellations.

## 2023-01-03 ENCOUNTER — PATIENT MESSAGE (OUTPATIENT)
Dept: ENDOCRINOLOGY | Facility: CLINIC | Age: 23
End: 2023-01-03
Payer: COMMERCIAL

## 2023-01-03 ENCOUNTER — TELEPHONE (OUTPATIENT)
Dept: ENDOCRINOLOGY | Facility: CLINIC | Age: 23
End: 2023-01-03
Payer: COMMERCIAL

## 2023-03-23 ENCOUNTER — TELEMEDICINE (OUTPATIENT)
Dept: ENDOCRINOLOGY | Facility: CLINIC | Age: 23
End: 2023-03-23
Payer: COMMERCIAL

## 2023-03-23 DIAGNOSIS — E10.65 TYPE 1 DIABETES MELLITUS WITH HYPERGLYCEMIA: Primary | ICD-10-CM

## 2023-03-23 LAB — HBA1C MFR BLD: 7.5 %

## 2023-03-23 PROCEDURE — 99213 OFFICE O/P EST LOW 20 MIN: CPT | Performed by: NURSE PRACTITIONER

## 2023-03-23 NOTE — PROGRESS NOTES
Chief Complaint  Diabetes    Subjective          Betty Reyes presents to [facility]  History of Present Illness       You have chosen to receive care through a telehealth visit.  Do you consent to use a video/audio connection for your medical care today? Yes        TELEHEALTH VIDEO VISIT     This a video visit due to [facility] current guidelines for social distancing due to the COVID 19 pandemic      Mode of Visit: Video  Location of patient: home  You have chosen to receive care through a telehealth visit.  Does the patient consent to use a video/audio connection for your medical care today? Yes  The visit included audio and video interaction. No technical issues occurred during this visit.           22 year old female presents for follow up      Reason diabetes mellitus type 1      Duration diagnosed at age 5        Delivered in July 2022      Timing constant      Quality near control              Severity high due to history of DKA        No micro or macrovascular complications         Current diabetes regimen      Insulin, omni pod            Current diabetes monitoring      Checks 6 to 10 times daily      Uses dexcom G6 could not download    Some high but several at goal    More stress the last few months        Review of Systems - General ROS: negative               Objective   Vital Signs:   There were no vitals taken for this visit.    Physical Exam  Neurological:      General: No focal deficit present.      Mental Status: She is alert.   Psychiatric:         Mood and Affect: Mood normal.         Thought Content: Thought content normal.         Judgment: Judgment normal.        Result Review :   The following data was reviewed by: HENRRY Friedman on 02/25/2022:  Common labs    Common Labs 7/14/22 7/17/22 3/20/23   Glucose 92 93    BUN 10 10    Creatinine 0.6 (A) 0.6 (A)    Sodium 134 (A) 136    Potassium 3.9 3.7    Chloride 106 108 (A)    Calcium 8.1  (A) 8.3 (A)    Albumin 3.2 (A) 3.0 (A)    Total Bilirubin 0.28 (A) 0.24 (A)    Alkaline Phosphatase 97 96    AST (SGOT) 14 15    ALT (SGPT) 9 9    Hemoglobin A1C   7.5   (A) Abnormal value                        Assessment and Plan    Diagnoses and all orders for this visit:    1. Type 1 diabetes mellitus with hyperglycemia (HCC) (Primary)           Glycemic Management    Diabetes mellitus type 1             Lab Results   Component Value Date    HGBA1C 7.5 03/20/2023        omni pod 5  and dexcom        No changes      BASAL     MN -1.0        1:30 am - 1.85     7:30 am - 1.65     7:30 pm - 1.65           Carb ratio     MN - 5     Sensitivity     50          target bg 115-120                  She does have the dexcom  and is using it      Dexcom has allowed the patient to minimize hypoglycemia as alarms have predicted and avoided them     She also uses the arrows on the dexcom as follows:     If arrow is horizontal , she uses the predicted bolus     If the arrow is slanted up or down-- she increase or decrease by 0.5 units     If the arrow is vertical up or down - she increases or decreases by 1 unit                           can only use Novolog      She has Humalog and is going to try it since her reaction was 8 years ago --she has tried the Humalog and having a severe reaction      She has gained weight, has edema in her legs, shortness of breath worsening anxiety and depression                                Previous regimen     -Basaglar  42 units            Novolog   ---        Do 1 unit per 8 of CHO for snacks                  Microvascular Complication Monitoring     had h o proteinuria and on lisinopril stopped the lisinopril     need to repeat urine protein          Weight Management               Weight gain      We ruled out cushing , the LDDST had a normal suppression - cortisol was 0.8                   Follow Up   No follow-ups on file.  Patient was given instructions and counseling regarding her  condition or for health maintenance advice. Please see specific information pulled into the AVS if appropriate.         This document has been electronically signed by HENRRY Friedman on March 23, 2023 16:25 CDT.

## 2023-04-04 RX ORDER — INSULIN PUMP CONTROLLER
1 EACH MISCELLANEOUS ONCE
Qty: 1 KIT | Refills: 0 | Status: SHIPPED | OUTPATIENT
Start: 2023-04-04 | End: 2023-04-04

## 2023-04-04 RX ORDER — INSULIN ASPART INJECTION 100 [IU]/ML
100 INJECTION, SOLUTION SUBCUTANEOUS DAILY
Qty: 30 ML | Refills: 11 | Status: SHIPPED | OUTPATIENT
Start: 2023-04-04

## 2023-04-04 RX ORDER — BLOOD-GLUCOSE SENSOR
1 EACH MISCELLANEOUS AS NEEDED
Qty: 3 EACH | Refills: 11 | Status: SHIPPED | OUTPATIENT
Start: 2023-04-04

## 2023-04-04 RX ORDER — INSULIN PUMP CONTROLLER
1 EACH MISCELLANEOUS
Qty: 5 EACH | Refills: 11 | Status: SHIPPED | OUTPATIENT
Start: 2023-04-04

## 2023-04-04 RX ORDER — BLOOD-GLUCOSE,RECEIVER,CONT
1 EACH MISCELLANEOUS AS NEEDED
Qty: 1 EACH | Refills: 0 | Status: SHIPPED | OUTPATIENT
Start: 2023-04-04

## 2023-04-28 RX ORDER — INSULIN PUMP CONTROLLER
1 EACH MISCELLANEOUS
Qty: 15 EACH | Refills: 11 | Status: SHIPPED | OUTPATIENT
Start: 2023-04-28

## 2023-08-02 RX ORDER — SEMAGLUTIDE 0.25 MG/.5ML
0.25 INJECTION, SOLUTION SUBCUTANEOUS WEEKLY
Qty: 3 ML | Refills: 4 | Status: SHIPPED | OUTPATIENT
Start: 2023-08-02

## 2023-08-08 ENCOUNTER — TELEPHONE (OUTPATIENT)
Dept: ENDOCRINOLOGY | Facility: CLINIC | Age: 23
End: 2023-08-08
Payer: COMMERCIAL

## 2023-08-08 NOTE — TELEPHONE ENCOUNTER
PT called asking if we have received the PA for Dexcom G7 and Wegovy)from the pharmacy. It was sent on 8/2/23.    Please advise    Thank you

## 2023-08-09 ENCOUNTER — DOCUMENTATION (OUTPATIENT)
Dept: ENDOCRINOLOGY | Facility: CLINIC | Age: 23
End: 2023-08-09
Payer: COMMERCIAL

## 2023-08-09 NOTE — PROGRESS NOTES
PA FOR DEXCOM G7 SENSOR AND WEGOVY CREATED AND SUBMITTED VIA [employer].    WEGOVY 0.25 MG APPROVED FROM 8/9/23 TO 3/6/24    SENT TO MED REC

## 2023-08-10 ENCOUNTER — DOCUMENTATION (OUTPATIENT)
Dept: ENDOCRINOLOGY | Facility: CLINIC | Age: 23
End: 2023-08-10
Payer: COMMERCIAL

## 2023-08-30 RX ORDER — INSULIN DEGLUDEC 200 U/ML
INJECTION, SOLUTION SUBCUTANEOUS
Qty: 15 ML | Refills: 2 | Status: SHIPPED | OUTPATIENT
Start: 2023-08-30

## 2024-09-10 NOTE — PROGRESS NOTES
Chief Complaint  Diabetes    Subjective          Betty Reyes presents to [facility]  History of Present Illness     You have chosen to receive care through a telehealth visit.  Do you consent to use a video/audio connection for your medical care today? Yes                TELEHEALTH VIDEO VISIT     This a video visit due to [facility] current guidelines for social distancing due to the COVID 19 pandemic          21 year old female presents for follow up    Reason diabetes mellitus type 1     Duration diagnosed at age 5     Timing constant     Quality near control          Last HgbA1c 6.5% from Nov. 3, 2021      Severity high due to history of DKA         Macrovascular complications none     Microvascular complications none         Current diabetes regimen      Insulin, omni pod          Current diabetes monitoring      Checks 6 to 10 times daily      Uses dexcom G6     States most goal     Review of Systems - General ROS: negative                   Objective   Vital Signs:   There were no vitals taken for this visit.    Physical Exam  Neurological:      General: No focal deficit present.      Mental Status: She is alert.   Psychiatric:         Mood and Affect: Mood normal.         Thought Content: Thought content normal.        Result Review :   The following data was reviewed by: HENRRY Friedman on 11/22/2021:  Common labs    Common Labsle 1/25/21   Albumin 4.5   Total Bilirubin 0.32   Alkaline Phosphatase 85   AST (SGOT) 16   ALT (SGPT) 26                     Assessment and Plan    Diagnoses and all orders for this visit:    1. Type 1 diabetes mellitus with hyperglycemia (HCC) (Primary)    2. Obesity, unspecified classification, unspecified obesity type, unspecified whether serious comorbidity present    Other orders  -     Semaglutide-Weight Management (semaglutide) 0.25 MG/0.5ML solution auto-injector; Inject 0.25 mg under the skin into the appropriate area  [de-identified] :  2 week post op visit s/p L LAIT for tx of recurrent tonsil stones/chronic tonsillitis. Patient reports persistent throat pain (L>R) radiating to L ear and jaw since procedure. Patient noted white spot to surgical site. Patient also reports red lesion to outer ear this morning. Patient also reports swollen glands.  Patient able to tolerate PO.  Patient denies fever, sweats, chills. as directed 1 (One) Time Per Week.  Dispense: 3 mL; Refill: 0  -     Semaglutide-Weight Management (Wegovy) 0.5 MG/0.5ML solution auto-injector; Inject 0.5 mg under the skin into the appropriate area as directed 1 (One) Time Per Week.  Dispense: 3 mL; Refill: 0           Glycemic Management           last HgbA1c 6.5% from 11-3-2021         Now back on omni pod         BASAL     MN -1.0      1:30 am - 1.85     7:30 am - 1.65     7:30 pm - 1.65           Carb ratio     MN - 5     Sensitivity     50          target bg 115-120        No changes today to pump      She adjust insulin based on cgm         She does have the dexcom  and is using it      Dexcom has allowed the patient to minimize hypoglycemia as alarms have predicted and avoided them     She also uses the arrows on the dexcom as follows:     If arrow is horizontal , she uses the predicted bolus     If the arrow is slanted up or down-- she increase or decrease by 0.5 units     If the arrow is vertical up or down - she increases or decreases by 1 unit                      can only use Novolog      She has Humalog and is going to try it since her reaction was 8 years ago --she has tried the Humalog and having a severe reaction      She has gained weight, has edema in her legs, shortness of breath worsening anxiety and depression            We will appeal the insurance decision -- she did try the humalog again and could not take it. She needs to be on novolog                        Previous regimen     -Basaglar  42 units            Novolog   ---        Do 1 unit per 8 of CHO for snacks                  Microvascular Complication Monitoring     had h o proteinuria and on lisinopril stopped the lisinopril     need to repeat urine protein                  -----------------------------------------      Weight Management     Obesity         Decrease caloric intake by 500 calories per day         Weight gain      We ruled out cushing , the LDDST had a normal  suppression - cortisol was 0.8       she is dieting and exercising and not losing weight     She is in a weight loss program      Start Wegovy  0.25 mg once weekly for 4 weeks then 0.5 mg once weekly                      Follow Up   No follow-ups on file.  Patient was given instructions and counseling regarding her condition or for health maintenance advice. Please see specific information pulled into the AVS if appropriate.         This document has been electronically signed by HENRRY Friedman on November 22, 2021 15:55 CST.